# Patient Record
Sex: MALE | Race: WHITE | HISPANIC OR LATINO
[De-identification: names, ages, dates, MRNs, and addresses within clinical notes are randomized per-mention and may not be internally consistent; named-entity substitution may affect disease eponyms.]

---

## 2018-10-03 ENCOUNTER — APPOINTMENT (OUTPATIENT)
Dept: NEUROLOGY | Facility: CLINIC | Age: 38
End: 2018-10-03
Payer: COMMERCIAL

## 2018-10-03 VITALS
HEIGHT: 68 IN | DIASTOLIC BLOOD PRESSURE: 72 MMHG | BODY MASS INDEX: 24.86 KG/M2 | WEIGHT: 164 LBS | HEART RATE: 75 BPM | SYSTOLIC BLOOD PRESSURE: 113 MMHG

## 2018-10-03 DIAGNOSIS — F45.0 SOMATIZATION DISORDER: ICD-10-CM

## 2018-10-03 PROCEDURE — 99203 OFFICE O/P NEW LOW 30 MIN: CPT

## 2022-02-10 ENCOUNTER — APPOINTMENT (OUTPATIENT)
Dept: CARDIOLOGY | Facility: CLINIC | Age: 42
End: 2022-02-10
Payer: COMMERCIAL

## 2022-02-10 ENCOUNTER — APPOINTMENT (OUTPATIENT)
Dept: ELECTROPHYSIOLOGY | Facility: CLINIC | Age: 42
End: 2022-02-10
Payer: COMMERCIAL

## 2022-02-10 ENCOUNTER — NON-APPOINTMENT (OUTPATIENT)
Age: 42
End: 2022-02-10

## 2022-02-10 VITALS
OXYGEN SATURATION: 95 % | HEIGHT: 68 IN | WEIGHT: 142 LBS | BODY MASS INDEX: 21.52 KG/M2 | SYSTOLIC BLOOD PRESSURE: 109 MMHG | DIASTOLIC BLOOD PRESSURE: 73 MMHG | HEART RATE: 9 BPM

## 2022-02-10 DIAGNOSIS — R06.00 DYSPNEA, UNSPECIFIED: ICD-10-CM

## 2022-02-10 DIAGNOSIS — R42 DIZZINESS AND GIDDINESS: ICD-10-CM

## 2022-02-10 DIAGNOSIS — R00.2 PALPITATIONS: ICD-10-CM

## 2022-02-10 DIAGNOSIS — R55 SYNCOPE AND COLLAPSE: ICD-10-CM

## 2022-02-10 PROCEDURE — 93000 ELECTROCARDIOGRAM COMPLETE: CPT

## 2022-02-10 PROCEDURE — 99205 OFFICE O/P NEW HI 60 MIN: CPT

## 2022-02-10 NOTE — DISCUSSION/SUMMARY
[FreeTextEntry1] : Patient is a 41 year old man with h/o lightheadedness, FH of HLD  presented to establish care in setting of progressive worsening of left sided numbness with variable HR and BPs. 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 In the meantime, diagnosed with POTS - was tried on multiple meds - neurological, cardiac, muscle spasm meds. Heart rate is all over the place. Now developing elevtaed BP reading. Underwent multipleEMGs\par \par - will refer for an echo and pharm nuclear stress test 9Pt is not able to walk in setting of ciontinuous muscle spasms and gait instability)\par - will place a cardiopatch\par - BP stable. Encouraged the patient to monitor blood pressure at home, keep a log, and report results back to us for evaluation. Based on results, we will adjust the regimen as necessary.\par = ECG with no acute ischemic changes (stable from prior)\par - pt being followed ny neurologist\par

## 2022-02-10 NOTE — HISTORY OF PRESENT ILLNESS
[FreeTextEntry1] : Patient is a 41 year old man with h/o lightheadedness, FH of HLD  presented to establish care in setting of progressive worsening of left sided numbness with variable HR and BPs. 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 In the meantime, diagnosed with POTS - was tried on multiple meds - neurological, cardiac, muscle spasm meds. Heart rate is all over the place. Now developing elevtaed BP reading. Underwent multipleEMGs\par

## 2022-02-23 ENCOUNTER — APPOINTMENT (OUTPATIENT)
Dept: CARDIOLOGY | Facility: CLINIC | Age: 42
End: 2022-02-23
Payer: COMMERCIAL

## 2022-02-23 PROCEDURE — 93306 TTE W/DOPPLER COMPLETE: CPT

## 2022-02-28 PROCEDURE — 93244 EXT ECG>48HR<7D REV&INTERPJ: CPT

## 2022-04-08 ENCOUNTER — OUTPATIENT (OUTPATIENT)
Dept: OUTPATIENT SERVICES | Facility: HOSPITAL | Age: 42
LOS: 1 days | End: 2022-04-08
Payer: COMMERCIAL

## 2022-04-08 DIAGNOSIS — R00.2 PALPITATIONS: ICD-10-CM

## 2022-04-08 PROCEDURE — 93018 CV STRESS TEST I&R ONLY: CPT

## 2022-04-08 PROCEDURE — 93016 CV STRESS TEST SUPVJ ONLY: CPT

## 2022-04-08 PROCEDURE — 93017 CV STRESS TEST TRACING ONLY: CPT

## 2022-05-11 ENCOUNTER — APPOINTMENT (OUTPATIENT)
Dept: CV DIAGNOSITCS | Facility: HOSPITAL | Age: 42
End: 2022-05-11

## 2024-09-06 ENCOUNTER — INPATIENT (INPATIENT)
Facility: HOSPITAL | Age: 44
LOS: 3 days | Discharge: HOME | DRG: 179 | End: 2024-09-10
Attending: STUDENT IN AN ORGANIZED HEALTH CARE EDUCATION/TRAINING PROGRAM | Admitting: STUDENT IN AN ORGANIZED HEALTH CARE EDUCATION/TRAINING PROGRAM
Payer: COMMERCIAL

## 2024-09-06 VITALS
WEIGHT: 160.94 LBS | OXYGEN SATURATION: 94 % | RESPIRATION RATE: 18 BRPM | HEIGHT: 67 IN | SYSTOLIC BLOOD PRESSURE: 114 MMHG | HEART RATE: 139 BPM | TEMPERATURE: 101 F | DIASTOLIC BLOOD PRESSURE: 70 MMHG

## 2024-09-06 LAB
ALBUMIN SERPL ELPH-MCNC: 4 G/DL — SIGNIFICANT CHANGE UP (ref 3.5–5)
ALP SERPL-CCNC: 136 U/L — HIGH (ref 40–120)
ALT FLD-CCNC: 110 U/L DA — HIGH (ref 10–60)
ANION GAP SERPL CALC-SCNC: 8 MMOL/L — SIGNIFICANT CHANGE UP (ref 5–17)
APPEARANCE UR: CLEAR — SIGNIFICANT CHANGE UP
APTT BLD: 35.7 SEC — HIGH (ref 24.5–35.6)
AST SERPL-CCNC: 46 U/L — HIGH (ref 10–40)
BASOPHILS # BLD AUTO: 0.06 K/UL — SIGNIFICANT CHANGE UP (ref 0–0.2)
BASOPHILS NFR BLD AUTO: 0.6 % — SIGNIFICANT CHANGE UP (ref 0–2)
BILIRUB SERPL-MCNC: 0.3 MG/DL — SIGNIFICANT CHANGE UP (ref 0.2–1.2)
BILIRUB UR-MCNC: NEGATIVE — SIGNIFICANT CHANGE UP
BUN SERPL-MCNC: 12 MG/DL — SIGNIFICANT CHANGE UP (ref 7–18)
CALCIUM SERPL-MCNC: 9.4 MG/DL — SIGNIFICANT CHANGE UP (ref 8.4–10.5)
CHLORIDE SERPL-SCNC: 104 MMOL/L — SIGNIFICANT CHANGE UP (ref 96–108)
CO2 SERPL-SCNC: 26 MMOL/L — SIGNIFICANT CHANGE UP (ref 22–31)
COLOR SPEC: SIGNIFICANT CHANGE UP
CREAT SERPL-MCNC: 0.87 MG/DL — SIGNIFICANT CHANGE UP (ref 0.5–1.3)
DIFF PNL FLD: NEGATIVE — SIGNIFICANT CHANGE UP
EGFR: 110 ML/MIN/1.73M2 — SIGNIFICANT CHANGE UP
EOSINOPHIL # BLD AUTO: 0.02 K/UL — SIGNIFICANT CHANGE UP (ref 0–0.5)
EOSINOPHIL NFR BLD AUTO: 0.2 % — SIGNIFICANT CHANGE UP (ref 0–6)
GLUCOSE SERPL-MCNC: 100 MG/DL — HIGH (ref 70–99)
GLUCOSE UR QL: NEGATIVE MG/DL — SIGNIFICANT CHANGE UP
HCT VFR BLD CALC: 43.5 % — SIGNIFICANT CHANGE UP (ref 39–50)
HGB BLD-MCNC: 13.8 G/DL — SIGNIFICANT CHANGE UP (ref 13–17)
IMM GRANULOCYTES NFR BLD AUTO: 0.5 % — SIGNIFICANT CHANGE UP (ref 0–0.9)
INR BLD: 1.08 RATIO — SIGNIFICANT CHANGE UP (ref 0.85–1.18)
KETONES UR-MCNC: NEGATIVE MG/DL — SIGNIFICANT CHANGE UP
LACTATE SERPL-SCNC: 1.2 MMOL/L — SIGNIFICANT CHANGE UP (ref 0.7–2)
LEUKOCYTE ESTERASE UR-ACNC: NEGATIVE — SIGNIFICANT CHANGE UP
LYMPHOCYTES # BLD AUTO: 0.21 K/UL — LOW (ref 1–3.3)
LYMPHOCYTES # BLD AUTO: 2.2 % — LOW (ref 13–44)
MANUAL SMEAR VERIFICATION: SIGNIFICANT CHANGE UP
MCHC RBC-ENTMCNC: 26.2 PG — LOW (ref 27–34)
MCHC RBC-ENTMCNC: 31.7 GM/DL — LOW (ref 32–36)
MCV RBC AUTO: 82.5 FL — SIGNIFICANT CHANGE UP (ref 80–100)
MONOCYTES # BLD AUTO: 1.01 K/UL — HIGH (ref 0–0.9)
MONOCYTES NFR BLD AUTO: 10.8 % — SIGNIFICANT CHANGE UP (ref 2–14)
NEUTROPHILS # BLD AUTO: 8.02 K/UL — HIGH (ref 1.8–7.4)
NEUTROPHILS NFR BLD AUTO: 85.7 % — HIGH (ref 43–77)
NITRITE UR-MCNC: NEGATIVE — SIGNIFICANT CHANGE UP
NRBC # BLD: 0 /100 WBCS — SIGNIFICANT CHANGE UP (ref 0–0)
PH UR: 6.5 — SIGNIFICANT CHANGE UP (ref 5–8)
PLAT MORPH BLD: NORMAL — SIGNIFICANT CHANGE UP
PLATELET # BLD AUTO: 329 K/UL — SIGNIFICANT CHANGE UP (ref 150–400)
POTASSIUM SERPL-MCNC: 3.7 MMOL/L — SIGNIFICANT CHANGE UP (ref 3.5–5.3)
POTASSIUM SERPL-SCNC: 3.7 MMOL/L — SIGNIFICANT CHANGE UP (ref 3.5–5.3)
PROT SERPL-MCNC: 8 G/DL — SIGNIFICANT CHANGE UP (ref 6–8.3)
PROT UR-MCNC: NEGATIVE MG/DL — SIGNIFICANT CHANGE UP
PROTHROM AB SERPL-ACNC: 12.3 SEC — SIGNIFICANT CHANGE UP (ref 9.5–13)
RBC # BLD: 5.27 M/UL — SIGNIFICANT CHANGE UP (ref 4.2–5.8)
RBC # FLD: 12.9 % — SIGNIFICANT CHANGE UP (ref 10.3–14.5)
RBC BLD AUTO: NORMAL — SIGNIFICANT CHANGE UP
SODIUM SERPL-SCNC: 138 MMOL/L — SIGNIFICANT CHANGE UP (ref 135–145)
SP GR SPEC: 1.01 — SIGNIFICANT CHANGE UP (ref 1–1.03)
TROPONIN I, HIGH SENSITIVITY RESULT: 3.8 NG/L — SIGNIFICANT CHANGE UP
UROBILINOGEN FLD QL: 0.2 MG/DL — SIGNIFICANT CHANGE UP (ref 0.2–1)
WBC # BLD: 9.37 K/UL — SIGNIFICANT CHANGE UP (ref 3.8–10.5)
WBC # FLD AUTO: 9.37 K/UL — SIGNIFICANT CHANGE UP (ref 3.8–10.5)

## 2024-09-06 PROCEDURE — 99285 EMERGENCY DEPT VISIT HI MDM: CPT

## 2024-09-06 PROCEDURE — 71046 X-RAY EXAM CHEST 2 VIEWS: CPT | Mod: 26

## 2024-09-06 PROCEDURE — 93010 ELECTROCARDIOGRAM REPORT: CPT

## 2024-09-06 RX ORDER — SODIUM CHLORIDE 9 MG/ML
2300 INJECTION INTRAMUSCULAR; INTRAVENOUS; SUBCUTANEOUS ONCE
Refills: 0 | Status: COMPLETED | OUTPATIENT
Start: 2024-09-06 | End: 2024-09-06

## 2024-09-06 RX ADMIN — SODIUM CHLORIDE 2300 MILLILITER(S): 9 INJECTION INTRAMUSCULAR; INTRAVENOUS; SUBCUTANEOUS at 22:25

## 2024-09-06 NOTE — ED ADULT TRIAGE NOTE - CHIEF COMPLAINT QUOTE
Pt reports that he has   CHEST  pain , FEVER , NECK pain, PALPITATION x 1 day .  positive covid   home test . CODE SEPSIS activated

## 2024-09-06 NOTE — ED ADULT NURSE NOTE - OBJECTIVE STATEMENT
AAOX4 c/o chest pain, body aches, reports positive home COVID test & SOB, fever , elevated HR starting earlier today. dENIES n/v/d

## 2024-09-07 ENCOUNTER — TRANSCRIPTION ENCOUNTER (OUTPATIENT)
Age: 44
End: 2024-09-07

## 2024-09-07 DIAGNOSIS — U07.1 COVID-19: ICD-10-CM

## 2024-09-07 DIAGNOSIS — Z29.9 ENCOUNTER FOR PROPHYLACTIC MEASURES, UNSPECIFIED: ICD-10-CM

## 2024-09-07 DIAGNOSIS — G90.9 DISORDER OF THE AUTONOMIC NERVOUS SYSTEM, UNSPECIFIED: ICD-10-CM

## 2024-09-07 DIAGNOSIS — R65.10 SYSTEMIC INFLAMMATORY RESPONSE SYNDROME (SIRS) OF NON-INFECTIOUS ORIGIN WITHOUT ACUTE ORGAN DYSFUNCTION: ICD-10-CM

## 2024-09-07 DIAGNOSIS — A41.9 SEPSIS, UNSPECIFIED ORGANISM: ICD-10-CM

## 2024-09-07 DIAGNOSIS — G45.9 TRANSIENT CEREBRAL ISCHEMIC ATTACK, UNSPECIFIED: ICD-10-CM

## 2024-09-07 LAB
ALBUMIN SERPL ELPH-MCNC: 3.3 G/DL — LOW (ref 3.5–5)
ALP SERPL-CCNC: 106 U/L — SIGNIFICANT CHANGE UP (ref 40–120)
ALT FLD-CCNC: 90 U/L DA — HIGH (ref 10–60)
ANION GAP SERPL CALC-SCNC: 6 MMOL/L — SIGNIFICANT CHANGE UP (ref 5–17)
AST SERPL-CCNC: 39 U/L — SIGNIFICANT CHANGE UP (ref 10–40)
B PERT DNA SPEC QL NAA+PROBE: SIGNIFICANT CHANGE UP
BILIRUB SERPL-MCNC: 0.3 MG/DL — SIGNIFICANT CHANGE UP (ref 0.2–1.2)
BUN SERPL-MCNC: 9 MG/DL — SIGNIFICANT CHANGE UP (ref 7–18)
C PNEUM DNA SPEC QL NAA+PROBE: SIGNIFICANT CHANGE UP
CALCIUM SERPL-MCNC: 9 MG/DL — SIGNIFICANT CHANGE UP (ref 8.4–10.5)
CHLORIDE SERPL-SCNC: 110 MMOL/L — HIGH (ref 96–108)
CO2 SERPL-SCNC: 26 MMOL/L — SIGNIFICANT CHANGE UP (ref 22–31)
CREAT SERPL-MCNC: 0.88 MG/DL — SIGNIFICANT CHANGE UP (ref 0.5–1.3)
EGFR: 109 ML/MIN/1.73M2 — SIGNIFICANT CHANGE UP
FLUAV H1 2009 PAND RNA SPEC QL NAA+PROBE: SIGNIFICANT CHANGE UP
FLUAV H1 RNA SPEC QL NAA+PROBE: SIGNIFICANT CHANGE UP
FLUAV H3 RNA SPEC QL NAA+PROBE: SIGNIFICANT CHANGE UP
FLUAV SUBTYP SPEC NAA+PROBE: SIGNIFICANT CHANGE UP
FLUBV RNA SPEC QL NAA+PROBE: SIGNIFICANT CHANGE UP
GLUCOSE SERPL-MCNC: 108 MG/DL — HIGH (ref 70–99)
HADV DNA SPEC QL NAA+PROBE: SIGNIFICANT CHANGE UP
HCOV PNL SPEC NAA+PROBE: SIGNIFICANT CHANGE UP
HCT VFR BLD CALC: 38.9 % — LOW (ref 39–50)
HGB BLD-MCNC: 12.5 G/DL — LOW (ref 13–17)
HMPV RNA SPEC QL NAA+PROBE: SIGNIFICANT CHANGE UP
HPIV1 RNA SPEC QL NAA+PROBE: SIGNIFICANT CHANGE UP
HPIV2 RNA SPEC QL NAA+PROBE: SIGNIFICANT CHANGE UP
HPIV3 RNA SPEC QL NAA+PROBE: SIGNIFICANT CHANGE UP
HPIV4 RNA SPEC QL NAA+PROBE: SIGNIFICANT CHANGE UP
MAGNESIUM SERPL-MCNC: 2.2 MG/DL — SIGNIFICANT CHANGE UP (ref 1.6–2.6)
MCHC RBC-ENTMCNC: 26.7 PG — LOW (ref 27–34)
MCHC RBC-ENTMCNC: 32.1 GM/DL — SIGNIFICANT CHANGE UP (ref 32–36)
MCV RBC AUTO: 83.1 FL — SIGNIFICANT CHANGE UP (ref 80–100)
NRBC # BLD: 0 /100 WBCS — SIGNIFICANT CHANGE UP (ref 0–0)
PHOSPHATE SERPL-MCNC: 3.3 MG/DL — SIGNIFICANT CHANGE UP (ref 2.5–4.5)
PLATELET # BLD AUTO: 289 K/UL — SIGNIFICANT CHANGE UP (ref 150–400)
POTASSIUM SERPL-MCNC: 3.8 MMOL/L — SIGNIFICANT CHANGE UP (ref 3.5–5.3)
POTASSIUM SERPL-SCNC: 3.8 MMOL/L — SIGNIFICANT CHANGE UP (ref 3.5–5.3)
PROT SERPL-MCNC: 6.6 G/DL — SIGNIFICANT CHANGE UP (ref 6–8.3)
RAPID RVP RESULT: DETECTED
RBC # BLD: 4.68 M/UL — SIGNIFICANT CHANGE UP (ref 4.2–5.8)
RBC # FLD: 13.2 % — SIGNIFICANT CHANGE UP (ref 10.3–14.5)
RV+EV RNA SPEC QL NAA+PROBE: SIGNIFICANT CHANGE UP
SARS-COV-2 RNA SPEC QL NAA+PROBE: DETECTED
SODIUM SERPL-SCNC: 142 MMOL/L — SIGNIFICANT CHANGE UP (ref 135–145)
TSH SERPL-MCNC: 0.6 UU/ML — SIGNIFICANT CHANGE UP (ref 0.34–4.82)
WBC # BLD: 7.09 K/UL — SIGNIFICANT CHANGE UP (ref 3.8–10.5)
WBC # FLD AUTO: 7.09 K/UL — SIGNIFICANT CHANGE UP (ref 3.8–10.5)

## 2024-09-07 PROCEDURE — 70450 CT HEAD/BRAIN W/O DYE: CPT | Mod: 26

## 2024-09-07 PROCEDURE — 99223 1ST HOSP IP/OBS HIGH 75: CPT

## 2024-09-07 PROCEDURE — 74176 CT ABD & PELVIS W/O CONTRAST: CPT | Mod: 26

## 2024-09-07 RX ORDER — ACETAMINOPHEN 325 MG/1
650 TABLET ORAL EVERY 6 HOURS
Refills: 0 | Status: DISCONTINUED | OUTPATIENT
Start: 2024-09-07 | End: 2024-09-10

## 2024-09-07 RX ORDER — SODIUM CHLORIDE 9 MG/ML
500 INJECTION INTRAMUSCULAR; INTRAVENOUS; SUBCUTANEOUS ONCE
Refills: 0 | Status: COMPLETED | OUTPATIENT
Start: 2024-09-07 | End: 2024-09-07

## 2024-09-07 RX ORDER — PIPERACILLIN SODIUM AND TAZOBACTAM SODIUM 3; .375 G/15ML; G/15ML
3.38 INJECTION, POWDER, FOR SOLUTION INTRAVENOUS ONCE
Refills: 0 | Status: COMPLETED | OUTPATIENT
Start: 2024-09-07 | End: 2024-09-07

## 2024-09-07 RX ORDER — IBUPROFEN 600 MG
200 TABLET ORAL ONCE
Refills: 0 | Status: COMPLETED | OUTPATIENT
Start: 2024-09-07 | End: 2024-09-07

## 2024-09-07 RX ORDER — ONDANSETRON 2 MG/ML
4 INJECTION, SOLUTION INTRAMUSCULAR; INTRAVENOUS EVERY 8 HOURS
Refills: 0 | Status: DISCONTINUED | OUTPATIENT
Start: 2024-09-07 | End: 2024-09-10

## 2024-09-07 RX ORDER — SODIUM CHLORIDE 9 MG/ML
1000 INJECTION INTRAMUSCULAR; INTRAVENOUS; SUBCUTANEOUS
Refills: 0 | Status: DISCONTINUED | OUTPATIENT
Start: 2024-09-07 | End: 2024-09-09

## 2024-09-07 RX ORDER — GUAIFENESIN 100 MG/5ML
600 LIQUID ORAL EVERY 12 HOURS
Refills: 0 | Status: DISCONTINUED | OUTPATIENT
Start: 2024-09-07 | End: 2024-09-10

## 2024-09-07 RX ORDER — BENZOCAINE/MENTHOL 20 %-0.5 %
1 AEROSOL (GRAM) TOPICAL THREE TIMES A DAY
Refills: 0 | Status: DISCONTINUED | OUTPATIENT
Start: 2024-09-07 | End: 2024-09-10

## 2024-09-07 RX ORDER — ENOXAPARIN SODIUM 100 MG/ML
40 INJECTION SUBCUTANEOUS EVERY 24 HOURS
Refills: 0 | Status: DISCONTINUED | OUTPATIENT
Start: 2024-09-07 | End: 2024-09-10

## 2024-09-07 RX ORDER — ACETAMINOPHEN 325 MG/1
100 TABLET ORAL ONCE
Refills: 0 | Status: COMPLETED | OUTPATIENT
Start: 2024-09-07 | End: 2024-09-07

## 2024-09-07 RX ORDER — MAGNESIUM, ALUMINUM HYDROXIDE 200-225/5
30 SUSPENSION, ORAL (FINAL DOSE FORM) ORAL EVERY 4 HOURS
Refills: 0 | Status: DISCONTINUED | OUTPATIENT
Start: 2024-09-07 | End: 2024-09-10

## 2024-09-07 RX ORDER — SODIUM CHLORIDE 0.65 %
1 AEROSOL, SPRAY (ML) NASAL
Refills: 0 | Status: DISCONTINUED | OUTPATIENT
Start: 2024-09-07 | End: 2024-09-10

## 2024-09-07 RX ADMIN — Medication 200 MILLIGRAM(S): at 11:45

## 2024-09-07 RX ADMIN — ACETAMINOPHEN 650 MILLIGRAM(S): 325 TABLET ORAL at 14:10

## 2024-09-07 RX ADMIN — Medication 1 SPRAY(S): at 12:13

## 2024-09-07 RX ADMIN — ACETAMINOPHEN 100 MILLIGRAM(S): 325 TABLET ORAL at 05:40

## 2024-09-07 RX ADMIN — SODIUM CHLORIDE 100 MILLILITER(S): 9 INJECTION INTRAMUSCULAR; INTRAVENOUS; SUBCUTANEOUS at 07:38

## 2024-09-07 RX ADMIN — ACETAMINOPHEN 650 MILLIGRAM(S): 325 TABLET ORAL at 22:30

## 2024-09-07 RX ADMIN — GUAIFENESIN 600 MILLIGRAM(S): 100 LIQUID ORAL at 17:14

## 2024-09-07 RX ADMIN — Medication 200 MILLIGRAM(S): at 12:38

## 2024-09-07 RX ADMIN — SODIUM CHLORIDE 100 MILLILITER(S): 9 INJECTION INTRAMUSCULAR; INTRAVENOUS; SUBCUTANEOUS at 10:03

## 2024-09-07 RX ADMIN — ACETAMINOPHEN 100 MILLIGRAM(S): 325 TABLET ORAL at 03:04

## 2024-09-07 RX ADMIN — ACETAMINOPHEN 650 MILLIGRAM(S): 325 TABLET ORAL at 15:10

## 2024-09-07 RX ADMIN — ACETAMINOPHEN 650 MILLIGRAM(S): 325 TABLET ORAL at 21:31

## 2024-09-07 RX ADMIN — Medication 1 LOZENGE: at 23:31

## 2024-09-07 RX ADMIN — ENOXAPARIN SODIUM 40 MILLIGRAM(S): 100 INJECTION SUBCUTANEOUS at 11:45

## 2024-09-07 RX ADMIN — PIPERACILLIN SODIUM AND TAZOBACTAM SODIUM 200 GRAM(S): 3; .375 INJECTION, POWDER, FOR SOLUTION INTRAVENOUS at 06:38

## 2024-09-07 RX ADMIN — SODIUM CHLORIDE 500 MILLILITER(S): 9 INJECTION INTRAMUSCULAR; INTRAVENOUS; SUBCUTANEOUS at 10:04

## 2024-09-07 RX ADMIN — ACETAMINOPHEN 650 MILLIGRAM(S): 325 TABLET ORAL at 08:55

## 2024-09-07 RX ADMIN — ACETAMINOPHEN 650 MILLIGRAM(S): 325 TABLET ORAL at 07:42

## 2024-09-07 NOTE — H&P ADULT - ASSESSMENT
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 Patient admitted for CVA r/o and sepsis. 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 Patient admitted for Sepsis and CVA r/o

## 2024-09-07 NOTE — CONSULT NOTE ADULT - TIME BILLING
- Review of records, telemetry, vital signs and daily labs.   - General and cardiovascular physical examination.  - Generation of cardiovascular treatment plan and completion of note .  - Coordination of care.      Patient was seen and examined by me on  9/7/24interim events noted,labs and radiology studies reviewed.  Ayaz Trotter MD,FACC.  [address] [facility].  [phone]

## 2024-09-07 NOTE — H&P ADULT - NSHPPOAPULMEMBOLUS_GEN_A_CORE
[EKG obtained to assist in diagnosis and management of assessed problem(s)] : EKG obtained to assist in diagnosis and management of assessed problem(s) no

## 2024-09-07 NOTE — H&P ADULT - NSHPPHYSICALEXAM_GEN_ALL_CORE
PHYSICAL EXAMINATION:  GENERAL: NAD  HEAD:  Atraumatic, Normocephalic  EYES:  conjunctiva and sclera clear  NECK: Supple, No JVD, Normal thyroid  CHEST/LUNG: Clear to auscultation. No rales, rhonchi, wheezing, or rubs  HEART: Regular rate and rhythm; No murmurs, rubs, or gallops  ABDOMEN: Soft, Nontender, Nondistended; Bowel sounds present  NERVOUS SYSTEM:  Alert & Oriented X3, patient has intermittent twitches, subjective left leg weakness, upper extremity power appears in tact, no CN deficits  EXTREMITIES:  2+ Peripheral Pulses, No clubbing, cyanosis, or edema  SKIN: warm dry    T(C): 38.1 (09-07-24 @ 07:51), Max: 38.4 (09-06-24 @ 21:18)  HR: 115 (09-07-24 @ 07:51) (115 - 139)  BP: 102/65 (09-07-24 @ 07:51) (102/65 - 120/79)  RR: 15 (09-07-24 @ 07:51) (14 - 18)  SpO2: 100% (09-07-24 @ 07:51) (94% - 100%)

## 2024-09-07 NOTE — ED PROVIDER NOTE - NS ED ROS FT
Constitutional: no fevers, chills  HEENT: +HA, no vision changes, rhinorrhea, sore throat  Cardiac: +chest pain, palpitations  Respiratory: no SOB, cough or hemoptysis  GI: no n/v/d/c, abd pain, bloody or dark stools  : no dysuria, +frequency, no hematuria  MSK: no joint pain, neck pain +back pain  Skin: no rashes, jaundice, pruritis  Neuro: no numbness/tingling. bedbound.   ROS otherwise neg except per MDM

## 2024-09-07 NOTE — ED PROVIDER NOTE - PHYSICAL EXAMINATION
General: non-toxic, NAD  HEENT: NCAT, PERRL, no conjunctival pallor, moist mucus membranes   Cardiac: tachycardic no murmurs, 2+ peripheral pulses  Resp: CTAB  Abdomen: soft, non-distended, bowel sounds present, no ttp, no rebound or guarding. no organomegaly  Extremities: no peripheral edema, calf tenderness, or leg size discrepancies  Skin: no rashes  Neuro: AAOx4, moving all extremities. poor lower extremity strength against gravity. diminished L UE sensation in distribution of prior TIA, otherwise grossly intact sensation. CN 2-12 intact.   Psych: mood and affect appropriate

## 2024-09-07 NOTE — H&P ADULT - PROBLEM SELECTOR PLAN 1
likely 2/2 COVID  f/u CTAP for potential alternative source of infection  s/p zosyn in ED  hold ABx unless source identified  supportive measures  c/w IVF

## 2024-09-07 NOTE — DISCHARGE NOTE PROVIDER - CARE PROVIDER_API CALL
Jaun Bowman [facility]  Internal Medicine  [address], [address]  [place] [address]  Phone: [phone]  Fax: [phone]  Follow Up Time:    Amber [facility]  Internal Medicine  [address] [place] [address]  Phone: [phone]  Fax: [phone]  Follow Up Time:    Ritchie Clark  Internal Medicine  [address] [place] [address]  Phone: [phone]  Fax: [phone]  Follow Up Time:     Bess Gavin  Urology  [address] [place] [address]  Phone: [phone]  Fax: [phone]  Follow Up Time: 2 weeks    Elliott Eisenberg  Neurology  [address], [address]  [place] [address]  Phone: [phone]  Fax: [phone]  Follow Up Time: 2 weeks

## 2024-09-07 NOTE — CONSULT NOTE ADULT - SUBJECTIVE AND OBJECTIVE BOX
PATIENT SEEN AND EXAMINED BY JOSE GUILLEN M.D. ON :- 9/7/24  DATE OF SERVICE:   9/7/24          Interim events noted,Labs ,Radiological studies and Cardiology tests reviewed .    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 Patient stated he started feeling chills with weakness and muscle aches yesterday morning. He was having palpitations and tachycardia as well as chest pain radiating to his left shoulder, similar to when he had his TIA in 2015. Patient took his PRN propanolol which usually resolves the palpitations and tachycardia but it did not work so he called his PCP who advised him to go to the ED for an EKG. Patient denies any dizziness ,headache, SOB, cough,  nausea, vomiting, diarrhea, abdominal pain,  lower extremity pain, or edema. Patient denies recent travel or sick contacts.  (07 Sep 2024 06:27)      PAST MEDICAL & SURGICAL HISTORY:      PREVIOUS DIAGNOSTIC TESTING:      ECHO  FINDINGS:    STRESS  FINDINGS:    CATHETERIZATION  FINDINGS:    MEDICATIONS  (STANDING):  benzocaine/menthol Lozenge 1 Lozenge Oral three times a day  enoxaparin Injectable 40 milliGRAM(s) SubCutaneous every 24 hours  guaiFENesin  milliGRAM(s) Oral every 12 hours  propranolol 5 milliGRAM(s) Oral every 12 hours  sodium chloride 0.9%. 1000 milliLiter(s) (100 mL/Hr) IV Continuous <Continuous>    MEDICATIONS  (PRN):  acetaminophen     Tablet .. 650 milliGRAM(s) Oral every 6 hours PRN Temp greater or equal to 38C (100.4F), Mild Pain (1 - 3)  aluminum hydroxide/magnesium hydroxide/simethicone Suspension 30 milliLiter(s) Oral every 4 hours PRN Dyspepsia  melatonin 3 milliGRAM(s) Oral at bedtime PRN Insomnia  ondansetron Injectable 4 milliGRAM(s) IV Push every 8 hours PRN Nausea and/or Vomiting  sodium chloride 0.65% Nasal 1 Spray(s) Both Nostrils four times a day PRN Nasal Congestion      FAMILY HISTORY:      SOCIAL HISTORY:    CIGARETTES:    ALCOHOL:    REVIEW OF SYSTEMS:  CONSTITUTIONAL: No fever, weight loss, or fatigue  EYES: No eye pain, visual disturbances, or discharge  ENMT:  No difficulty hearing, tinnitus, vertigo; No sinus or throat pain  NECK: No pain or stiffness  RESPIRATORY: No cough, wheezing, chills or hemoptysis; No shortness of breath  CARDIOVASCULAR: No chest pain, palpitations, dizziness, or leg swelling  GASTROINTESTINAL: No abdominal or epigastric pain. No nausea, vomiting, or hematemesis; No diarrhea or constipation. No melena or hematochezia.  GENITOURINARY: No dysuria, frequency, hematuria, or incontinence  NEUROLOGICAL: No headaches, memory loss, loss of strength, numbness, or tremors  SKIN: No itching, burning, rashes, or lesions   LYMPH NODES: No enlarged glands  ENDOCRINE: No heat or cold intolerance; No hair loss  MUSCULOSKELETAL: No joint pain or swelling; No muscle, back, or extremity pain  PSYCHIATRIC: No depression, anxiety, mood swings, or difficulty sleeping  HEME/LYMPH: No easy bruising, or bleeding gums  ALLERY AND IMMUNOLOGIC: No hives or eczema    Vital Signs Last 24 Hrs  T(C): 37.9 (07 Sep 2024 19:56), Max: 38.3 (07 Sep 2024 11:57)  T(F): 100.2 (07 Sep 2024 19:56), Max: 100.9 (07 Sep 2024 11:57)  HR: 104 (07 Sep 2024 19:56) (95 - 116)  BP: 119/80 (07 Sep 2024 19:56) (98/62 - 120/79)  BP(mean): --  RR: 18 (07 Sep 2024 19:56) (14 - 19)  SpO2: 99% (07 Sep 2024 19:56) (95% - 100%)    Parameters below as of 07 Sep 2024 19:56  Patient On (Oxygen Delivery Method): room air          PHYSICAL EXAM:  GENERAL: NAD, well-groomed, well-developed  HEAD:  Atraumatic, Normocephalic  EYES: EOMI, PERRLA, conjunctiva and sclera clear  ENMT: No tonsillar erythema, exudates, or enlargement; Moist mucous membranes, Good dentition, No lesions  NECK: Supple, No JVD, Normal thyroid  NERVOUS SYSTEM:  Alert & Oriented X3, Good concentration; Motor Strength 5/5 B/L upper and lower extremities; DTRs 2+ intact and symmetric  CHEST/LUNG: Clear to percussion bilaterally; No rales, rhonchi, wheezing, or rubs  HEART: Regular rate and rhythm; No murmurs, rubs, or gallops  ABDOMEN: Soft, Nontender, Nondistended; Bowel sounds present  EXTREMITIES:  2+ Peripheral Pulses, No clubbing, cyanosis, or edema  LYMPH: No lymphadenopathy noted  SKIN: No rashes or lesions      INTERPRETATION OF TELEMETRY:    [facility]    [facility]     LABS:                        12.5   7.09  )-----------( 289      ( 07 Sep 2024 12:13 )             38.9     09-07    142  |  110<H>  |  9   ----------------------------<  108<H>  3.8   |  26  |  0.88    Ca    9.0      07 Sep 2024 12:13  Phos  3.3     09-07  Mg     2.2     09-07    TPro  6.6  /  Alb  3.3<L>  /  TBili  0.3  /  DBili  x   /  AST  39  /  ALT  90<H>  /  AlkPhos  106  09-07        PT/INR - ( 06 Sep 2024 22:00 )   PT: 12.3 sec;   INR: 1.08 ratio         PTT - ( 06 Sep 2024 22:00 )  PTT:35.7 sec  Urinalysis Basic - ( 07 Sep 2024 12:13 )    Color: x / Appearance: x / SG: x / pH: x  Gluc: 108 mg/dL / Ketone: x  / Bili: x / Urobili: x   Blood: x / Protein: x / Nitrite: x   Leuk Esterase: x / RBC: x / WBC x   Sq Epi: x / Non Sq Epi: x / Bacteria: x      Lipid Panel:   I&O's Summary      RADIOLOGY & ADDITIONAL STUDIES:

## 2024-09-07 NOTE — ED PROVIDER NOTE - CLINICAL SUMMARY MEDICAL DECISION MAKING FREE TEXT BOX
pt with hx TIA secondary to traumatic chiropractic manipulation in 2015, ischemic brain injury, autonomic dysfunction, bedbound at baseline presents with daughter after testing positive for covid at home today in the setting of chest pain and persistently elevated HR, sent in for evaluation by his PCP. pt endorses pain in the neck, arm, and low back. unable to take strong doses of pain medication due to autonomic instability, requesting 100mg tylenol only. no falls or trauma. sensation mildly decreased in distribution of prior CVA. possible recrudescence of prior injury in the setting of acute illness. strength to upper extremities intact. clear lungs and stable O2 on room air. pt requesting to be admitted as his parents are elderly and unable to take care of him with this contagious illness. r/o superimposed PE v pna. otherwise hold empiric antibiotics at this time.

## 2024-09-07 NOTE — CONSULT NOTE ADULT - ASSESSMENT
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 Patient admitted for Sepsis and CVA r/o      # Sepsis.   # covid  # TIA   TIA 2/2 traumatic chiropractic manipulation  patient possibly having recurrance of symptoms ISO infection  f/u CTH.

## 2024-09-07 NOTE — DISCHARGE NOTE PROVIDER - PROVIDER TOKENS
PROVIDER:[TOKEN:[[id number]]] PROVIDER:[TOKEN:[[id number]]] PROVIDER:[TOKEN:[[id number]]],PROVIDER:[TOKEN:[[id number]],FOLLOWUP:[2 weeks]],PROVIDER:[TOKEN:[[id number]],FOLLOWUP:[2 weeks]]

## 2024-09-07 NOTE — H&P ADULT - ATTENDING COMMENTS
44yo m, former smoker, w pots, gerd, somatization disorder vs chiropractic stroke (at baseline w unsteady gait, vertigo, myoclonic jerks, left sided paresthesias), p/w positive covid test; in er, found to be meeting sepsis criteria; admit to medicine for further mgmt.    Sepsis  Left shift, febrile, tachycardic; meets sepsis criteria  source; based on work up thus far, suspecting covid  s/p 2.3 L ns + zosyn in ER  follow up blood cultures; full rvp  Monitor for fever, changes in white count; hold off on further abx for now pending work up  ivf resusci + lytes as needed.    Covid  in no respiratory distress on encounter, w adequate spo2 at room air  chest imaging w no acute significant pathological findings  follow up full rvp  trend inflammatory markers    isolation precautions  Monitor SpO2, RR, for signs of respiratory distress; Goal SpO2 >88-92%, PaO2 >55-60 mmHg  bronchodilators + oxygen supplementation as needed to  aggressive pulmonary toilet/hygiene   symptomatic relief with antitussives, mucolytics, antipyretics as needed  does not meet criteria for remdesivir or dexamethasone use    Otherwise, concur w a+p as described by resident above

## 2024-09-07 NOTE — H&P ADULT - PROBLEM SELECTOR PLAN 4
2/2 traumatic chiropractic manipulation  on PRN metoprolol 2.5 at home 2/2 traumatic chiropractic manipulation  on PRN propanolol 2.5 at home  consider cardio consult if persisitent tachy

## 2024-09-07 NOTE — DISCHARGE NOTE PROVIDER - NSDCCAREPROVSEEN_GEN_ALL_CORE_FT
Ni Barrett Arnold, Ni Barrett, Ni Mcconnell, Paulie Mcgregor, Abram Trotter, Ayaz WATTS Arnold, Ni Barrett, Ni Mcconnell, Paulie Mcgregor, Abram Trotter, Whitney Brunson

## 2024-09-07 NOTE — DISCHARGE NOTE PROVIDER - ATTENDING DISCHARGE PHYSICAL EXAMINATION:
GENERAL: NAD, well built  HEAD:  Atraumatic, Normocephalic  EYES:  conjunctiva and sclera clear  CHEST/LUNG: Clear to auscultation. No rales, rhonchi, wheezing, or rubs  HEART: Regular rate and rhythm; No murmurs, rubs, or gallops  ABDOMEN: Soft, Nontender, Nondistended; Bowel sounds present  NERVOUS SYSTEM:  Alert & Oriented X3,  No focal deficits  EXTREMITIES:  2+ Peripheral Pulses, No clubbing, cyanosis, or edema  SKIN: warm dry

## 2024-09-07 NOTE — DISCHARGE NOTE PROVIDER - NSDCCPCAREPLAN_GEN_ALL_CORE_FT
PRINCIPAL DISCHARGE DIAGNOSIS  Diagnosis: COVID-19  Assessment and Plan of Treatment: .For up to date information information on COVID -19, you may contact the Department of Health Hotline at [phone]. Tylenol 650mg tablet twice a day for fever and you can also apply cool compress for fever. You can take Aleve 2 pills twice a day with food for bodyaches .  Perform frequent good hand hygiene by washing your hands with soap and hot water as can tolerate but not to burn your hands.  Disinfect your house doorknobs, steering wheels, refrigerator door handles.  If your symptoms worsen, fever, chills, chest pain or sob, call your doctor.  Call 911 for severe shortness of breath or get to your nearest Emergency Department and inform the staff or 911 that you were tested positive for COVID-19.  Continue to self quarantine and wear a mask so as to not infect others.  Drink lots of fluids.        SECONDARY DISCHARGE DIAGNOSES  Diagnosis: TIA (transient ischemic attack)  Assessment and Plan of Treatment:     Diagnosis: Autonomic dysfunction  Assessment and Plan of Treatment:      PRINCIPAL DISCHARGE DIAGNOSIS  Diagnosis: COVID-19  Assessment and Plan of Treatment: You presented to the ED with a positive COVID test. A repeat PCR test confirmed a COVID infection. You were found to have a fever and received Tylenol. You received supportive care (medication mitigating your symptoms) with which your symptoms improved.   Please follow up with your PCP within one week of discharge for follow up and further management.        SECONDARY DISCHARGE DIAGNOSES  Diagnosis: Autonomic dysfunction  Assessment and Plan of Treatment: You have a history of autonomic dysfunction which causes an increased heart rate. You take Propanolol as needed at home. During this admission, you were seen by a Cardiologist (heart specialist) who recommended 5mg of Propanolol twice daily. During this admission, your heart rate was well controlled around 90bpm under this regimen.  Please follow up with your PCP within one week of discharge for follow up and further management.    Diagnosis: TIA (transient ischemic attack)  Assessment and Plan of Treatment: You have a history of TIA (transient ischemic attack), which is a brief loss of neurological funciton. You described your symptoms to be similar to the previous episode, so a CT head was performed. It showed no signs of intracranial hemorrhage. Your sympo     PRINCIPAL DISCHARGE DIAGNOSIS  Diagnosis: COVID-19  Assessment and Plan of Treatment: You presented to the ED with a positive COVID test. A repeat PCR test confirmed a COVID infection. You were found to have a fever and received Tylenol. You received supportive care (medication mitigating your symptoms) with which your symptoms improved.   Please follow up with your PCP within one week of discharge for follow up and further management.        SECONDARY DISCHARGE DIAGNOSES  Diagnosis: Autonomic dysfunction  Assessment and Plan of Treatment: You have a history of autonomic dysfunction which causes an increased heart rate. You take Propanolol as needed at home. During this admission, you were seen by a Cardiologist (heart specialist) who recommended 5mg of Propanolol twice daily. During this admission, your heart rate was well controlled around 90bpm under this regimen.  Please follow up with your PCP within one week of discharge for follow up and further management.    Diagnosis: TIA (transient ischemic attack)  Assessment and Plan of Treatment: You have a history of TIA (transient ischemic attack), which is a brief loss of neurological funciton. You described your symptoms to be similar to the previous episode, so a CT head was performed. It showed no signs of intracranial hemorrhage. Your sympo    Diagnosis: Hepatic steatosis  Assessment and Plan of Treatment: You were found to have elevated liver function markers in your admission blood work.     PRINCIPAL DISCHARGE DIAGNOSIS  Diagnosis: COVID-19  Assessment and Plan of Treatment: You presented to the ED with a positive COVID test. A repeat PCR test confirmed a COVID infection. You were found to have a fever and received Tylenol. You received supportive care (medication mitigating your symptoms) with which your symptoms improved.   Please follow up with your PCP within one week of discharge for follow up and further management.        SECONDARY DISCHARGE DIAGNOSES  Diagnosis: Autonomic dysfunction  Assessment and Plan of Treatment: You have a history of autonomic dysfunction which causes an increased heart rate. You take Propanolol as needed at home. During this admission, you were seen by a Cardiologist (heart specialist) who recommended 5mg of Propanolol twice daily. During this admission, your heart rate was well controlled around 90bpm under this regimen.  Please follow up with your PCP within one week of discharge for follow up and further management.    Diagnosis: TIA (transient ischemic attack)  Assessment and Plan of Treatment: You have a history of TIA (transient ischemic attack), which is a brief loss of neurological funciton. You described your symptoms to be similar to the previous episode, so a CT head was performed. It showed no signs of intracranial hemorrhage. Your symptoms improved.   Please follow up with your PCP within one week of discharge for follow up and further management.    Diagnosis: Hepatic steatosis  Assessment and Plan of Treatment: You were found to have elevated liver function markers in your admission blood work. An ultrasound of the right upper quadrant showed hepatic steatosis (fat buildup in the liver) and mild hepatomegaly (enlargement of the liver).  Please follow up with your PCP within one week of discharge for follow up blood work and further management. If your liver function tests remain elevated, we recommend follow up with a Hepatologist (liver specialist).       Diagnosis: Hematuria  Assessment and Plan of Treatment: During your admission, you reported that you had noticed blood in your urine. A urine analysis confirmed red blood cells in your urine, but no signs of infection.   Please follow up with your PCP within one week of discharge for follow up and further management. We also strongly recommend following up with a Urologist to complete the previously recommended cystoscopy.     PRINCIPAL DISCHARGE DIAGNOSIS  Diagnosis: COVID-19  Assessment and Plan of Treatment: You presented to the ED with a positive COVID test. A repeat PCR test confirmed a COVID infection. You were found to have a fever and received Tylenol. You received supportive care (medication mitigating your symptoms) with which your symptoms improved.   Please follow up with your PCP within one week of discharge for follow up and further management.        SECONDARY DISCHARGE DIAGNOSES  Diagnosis: Autonomic dysfunction  Assessment and Plan of Treatment: You have a history of autonomic dysfunction which causes an increased heart rate. You take Propanolol as needed at home. During this admission, you were seen by a Cardiologist (heart specialist) who recommended 5mg of Propanolol twice daily. During this admission, your heart rate was well controlled around 90bpm under this regimen.   Please continue to take your Propanolol as instructed.  Please follow up with your PCP within one week of discharge for follow up and further management.    Diagnosis: TIA (transient ischemic attack)  Assessment and Plan of Treatment: You have a history of TIA (transient ischemic attack), which is a brief loss of neurological funciton. You described your symptoms to be similar to the previous episode, so a CT head was performed. It showed no signs of intracranial hemorrhage. Your symptoms improved.   Please follow up with your PCP within one week of discharge for follow up and further management.    Diagnosis: Hepatic steatosis  Assessment and Plan of Treatment: You were found to have elevated liver function markers in your admission blood work. An ultrasound of the right upper quadrant showed hepatic steatosis (fat buildup in the liver) and mild hepatomegaly (enlargement of the liver).  Please follow up with your PCP within one week of discharge for follow up blood work and further management. If your liver function tests remain elevated, we recommend follow up with a Hepatologist (liver specialist).       Diagnosis: Hematuria  Assessment and Plan of Treatment: During your admission, you reported that you had noticed blood in your urine. A urine analysis confirmed red blood cells in your urine, but no signs of infection.   Please follow up with your PCP within one week of discharge for follow up and further management. We also strongly recommend following up with a Urologist to complete the previously recommended cystoscopy.     PRINCIPAL DISCHARGE DIAGNOSIS  Diagnosis: COVID-19  Assessment and Plan of Treatment: You presented to the ED with a positive COVID test. A repeat PCR test confirmed a COVID infection. You were found to have a fever and received Tylenol. You received supportive care (medication mitigating your symptoms) with which your symptoms improved.   Please follow up with your PCP within one week of discharge for follow up and further management.        SECONDARY DISCHARGE DIAGNOSES  Diagnosis: Hepatic steatosis  Assessment and Plan of Treatment: You were found to have elevated liver function markers in your admission blood work. An ultrasound of the right upper quadrant showed hepatic steatosis (fat buildup in the liver) and mild hepatomegaly (enlargement of the liver).  Please follow up with your PCP within one week of discharge for follow up blood work and further management. If your liver function tests remain elevated, we recommend follow up with a Hepatologist (liver specialist).       Diagnosis: Hematuria  Assessment and Plan of Treatment: During your admission, you reported that you had noticed blood in your urine. A urine analysis confirmed red blood cells in your urine, but no signs of infection.   Please follow up with your PCP within one week of discharge for follow up and further management. We also strongly recommend following up with a Urologist to complete the previously recommended cystoscopy.    Diagnosis: Autonomic dysfunction  Assessment and Plan of Treatment: You have a history of autonomic dysfunction which causes an increased heart rate. You take Propanolol as needed at home. During this admission, you were seen by a Cardiologist (heart specialist) who recommended 5mg of Propanolol twice daily. During this admission, your heart rate was well controlled around 90bpm under this regimen.   Please continue to take your Propanolol as instructed.  Please follow up with your PCP within one week of discharge for follow up and further management.    Diagnosis: TIA (transient ischemic attack)  Assessment and Plan of Treatment: You have a history of TIA (transient ischemic attack), which is a brief loss of neurological funciton. You described your symptoms to be similar to the previous episode, so a CT head was performed. It showed no signs of intracranial hemorrhage. Your symptoms improved.   Please follow up with your PCP within one week of discharge for follow up and further management.

## 2024-09-07 NOTE — DISCHARGE NOTE PROVIDER - CARE PROVIDERS DIRECT ADDRESSES
,DirectAddress_Unknown ,[email or web address]

## 2024-09-07 NOTE — H&P ADULT - HISTORY OF PRESENT ILLNESS
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 Patient stated he started feeling chills with weakness and muscle aches yesterday morning. He was having palpitations and tachycardia as well as chest pain radiating to his left shoulder, similar to when he had his TIA in 2015. Patient took his PRN propanolol which usually resolves the palpitations and tachycardia but it did not work so he called his PCP who advised him to go to the ED for an EKG. Patient denies any dizziness ,headache, SOB, cough,  nausea, vomiting, diarrhea, abdominal pain,  lower extremity pain, or edema. Patient denies recent travel or sick contacts.

## 2024-09-07 NOTE — DISCHARGE NOTE PROVIDER - HOSPITAL COURSE
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 Patient stated he started feeling chills with weakness and muscle aches yesterday morning. He was having palpitations and tachycardia as well as chest pain radiating to his left shoulder, similar to when he had his TIA in 2015. Patient took his PRN propanolol which usually resolves the palpitations and tachycardia but it did not work so he called his PCP who advised him to go to the ED for an EKG. Patient denies any dizziness, headache, SOB, cough, nausea, vomiting, diarrhea, abdominal pain, lower extremity pain, or edema. Patient denies recent travel or sick contacts.     Patient was admitted for Sepsis and CVA r/o.  Initially received Zosyn in ED.  His was monitored off Antibiotics.  Pt was also evaluated by Cardiology for his tachycardia.    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 Patient stated he started feeling chills with weakness and muscle aches yesterday morning. He was having palpitations and tachycardia as well as chest pain radiating to his left shoulder, similar to when he had his TIA in 2015. Patient took his PRN propanolol which usually resolves the palpitations and tachycardia but it did not work so he called his PCP who advised him to go to the ED for an EKG. Patient denies any dizziness, headache, SOB, cough, nausea, vomiting, diarrhea, abdominal pain, lower extremity pain, or edema. Patient denies recent travel or sick contacts.     In the ED, the patient received IV fluids and single dose of Zosyn. A CXR and showed no abnormalitites. A CTA ruled out pulmonary embolism. A CT head showed no signs of intracranial hemorrhage. CT of the abdomen and pelvis showed light left renal collecting system fullness and left extrarenal pelvis of unclear significance without obstructing ureteral calculus identified. Urinalaysis was negative. RVP was positive for COVID. Patient admitted for sepsis likely 2/2 COVID.    After admission, the patient was intermittently febrile but saturated well on room air. The patient's symptoms were well managed with suppportive care  (tylenol, methocarbamol, mucinex). The patient was seen by Cardiology for his known history of tachycardia. 5mg of Propanolol bid was recommended, which controlled the patient's heart rate around 90bpm. The patient was found to have elevated LFTs (AST 64, ), for which a RUQ ultrasound was completed which revealed XXXX. LFTs were XXXXX on repeat measurements.    On the second day of admission, the patient endorsed dysuria which had started two days prior, accompanied by blood from the urethral meatus. Urine analysis revealed elevated RBCs, but no WBCs. Outpatient follow up with Urology was recommended for hematuria.    The patient was hemodynamically stable and ready for discharge. This is only a summary of the hospital course, please refer to the chart for additional detail. 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 Patient stated he started feeling chills with weakness and muscle aches one day prior to presenting to the ED. He endorsepalpitations and tachycardia as well as chest pain radiating to his left shoulder, similar to when he had his TIA in 2015. Patient took his PRN propanolol which usually resolves the palpitations and tachycardia but it did not work so he called his PCP who advised him to go to the ED for an EKG. Patient denies any dizziness, headache, SOB, cough, nausea, vomiting, diarrhea, abdominal pain, lower extremity pain, or edema. Patient denies recent travel or sick contacts.     In the ED, the patient received IV fluids and single dose of Zosyn. A CXR and showed no abnormalitites. A CTA ruled out pulmonary embolism. A CT head showed no signs of intracranial hemorrhage. CT of the abdomen and pelvis showed light left renal collecting system fullness and left extrarenal pelvis of unclear significance without obstructing ureteral calculus identified. Urinalaysis was negative. RVP was positive for COVID. Patient admitted for sepsis likely 2/2 COVID.    After admission, the patient was intermittently febrile but saturated well on room air. The patient's symptoms were well managed with supportive care  (tylenol, methocarbamol, mucinex). The patient was seen by Cardiology for his known history of tachycardia. 5mg of Propanolol bid was recommended, which controlled the patient's heart rate around 90bpm. The patient was found to have elevated LFTs (AST 64, ), for which a RUQ ultrasound was completed which revealed hepatic steatosis and mild hepatomegaly. LFTs were XXXXX on repeat measurements. The patient was recommended outpatient follow up with his PCP and a Hepatologist.    On the second day of admission, the patient endorsed dysuria which had started two days prior, accompanied by blood from the urethral meatus. Urine analysis revealed elevated RBCs, but no WBCs. Outpatient follow up with Urology was recommended for hematuria.    The patient was hemodynamically stable and ready for discharge. This is only a summary of the hospital course, please refer to the chart for additional detail. 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 Patient stated he started feeling chills with weakness and muscle aches one day prior to presenting to the ED. He endorsepalpitations and tachycardia as well as chest pain radiating to his left shoulder, similar to when he had his TIA in 2015. Patient took his PRN propanolol which usually resolves the palpitations and tachycardia but it did not work so he called his PCP who advised him to go to the ED for an EKG. Patient denies any dizziness, headache, SOB, cough, nausea, vomiting, diarrhea, abdominal pain, lower extremity pain, or edema. Patient denies recent travel or sick contacts.     In the ED, the patient received IV fluids and single dose of Zosyn. A CXR and showed no abnormalitites. A CTA ruled out pulmonary embolism. A CT head showed no signs of intracranial hemorrhage. CT of the abdomen and pelvis showed light left renal collecting system fullness and left extrarenal pelvis of unclear significance without obstructing ureteral calculus identified. Urinalaysis was negative. RVP was positive for COVID. Patient admitted for sepsis likely 2/2 COVID.    After admission, the patient was intermittently febrile but saturated well on room air. The patient's symptoms were well managed with supportive care  (tylenol, methocarbamol, mucinex). The patient was seen by Cardiology for his known history of tachycardia. 5mg of Propanolol bid was recommended, which controlled the patient's heart rate around 90bpm. The patient was found to have elevated LFTs (AST 64, ), for which a RUQ ultrasound was completed which revealed hepatic steatosis and mild hepatomegaly. LFTs were downtrending on repeat measurements. The patient was recommended outpatient follow up with his PCP and a Hepatologist.    On the second day of admission, the patient endorsed dysuria which had started two days prior, accompanied by blood from the urethral meatus. Urine analysis revealed elevated RBCs, but no WBCs. Outpatient follow up with Urology was recommended for hematuria.    The patient was hemodynamically stable and ready for discharge. This is only a summary of the hospital course, please refer to the chart for additional detail. 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 Patient stated he started feeling chills with weakness and muscle aches one day prior to presenting to the ED. He endorses palpitations and tachycardia as well as chest pain radiating to his left shoulder, similar to when he had his TIA in 2015. Patient took his PRN propanolol which usually resolves the palpitations and tachycardia but it did not work so he called his PCP who advised him to go to the ED for an EKG. Patient denies any dizziness, headache, SOB, cough, nausea, vomiting, diarrhea, abdominal pain, lower extremity pain, or edema. Patient denies recent travel or sick contacts.     In the ED, the patient received IV fluids and single dose of Zosyn. A CXR and showed no abnormalities. A CTA ruled out pulmonary embolism. A CT head showed no signs of intracranial hemorrhage. CT of the abdomen and pelvis showed light left renal collecting system fullness and left extrarenal pelvis of unclear significance without obstructing ureteral calculus identified. Urinalaysis was negative. RVP was positive for COVID. Patient admitted for sepsis likely 2/2 COVID.    After admission, the patient was intermittently febrile but saturated well on room air. The patient's symptoms were well managed with supportive care  (tylenol, methocarbamol, mucinex). The patient was seen by Cardiology for his known history of tachycardia. 5mg of Propanolol bid was recommended, which controlled the patient's heart rate around 90bpm. The patient was found to have elevated LFTs (AST 64, ), for which a RUQ ultrasound was completed which revealed hepatic steatosis and mild hepatomegaly. LFTs were downtrending on repeat measurements. The patient was recommended outpatient follow up with his PCP and a Hepatologist.    On the second day of admission, the patient endorsed dysuria which had started two days prior, accompanied by blood from the urethral meatus. Urine analysis revealed elevated RBCs, but no WBCs. Outpatient follow up with Urology was recommended for hematuria.    The patient was hemodynamically stable and ready for discharge. This is only a summary of the hospital course, please refer to the chart for additional detail.

## 2024-09-07 NOTE — CHART NOTE - NSCHARTNOTEFT_GEN_A_CORE
Patient seen and examined at bedside this morning. He complains of cough and fever in the morning. Feels cough is productive with white sputum. He also complains of  L sided shoulder/neck pain which he states has been ongoing since his issue with the chiropractor. He also notes lower back pain mainly on the R sided. Denies any dysuria or abdominal pain. He notes his HR is usually high when he gets up and says its like POTS and he has a hx of autonomic dysfunction since he got covid in 2020.    Labs, vitals and imaging reviewed as below. Tmax this morning noted 100.9, HR in 100s, tele noted with HR 110s in sinus tachycardia. RVP + covid   PE – NC/AT, tachycardic, lungs grossly clear, abd soft, ntnd, no LE edema or calf ttp, with R L spine paraspinal muscle with tightness and mild ttp     Vital Signs Last 24 Hrs  T(C): 38.3 (07 Sep 2024 11:57), Max: 38.4 (06 Sep 2024 21:18)  T(F): 100.9 (07 Sep 2024 11:57), Max: 101.1 (06 Sep 2024 21:18)  HR: 105 (07 Sep 2024 11:57) (105 - 139)  BP: 111/56 (07 Sep 2024 11:57) (102/65 - 120/79)  BP(mean): --  RR: 19 (07 Sep 2024 11:57) (14 - 19)  SpO2: 97% (07 Sep 2024 11:57) (94% - 100%)    Parameters below as of 07 Sep 2024 11:57  Patient On (Oxygen Delivery Method): room air                          12.5   7.09  )-----------( 289      ( 07 Sep 2024 12:13 )             38.9     09-07    142  |  110<H>  |  9   ----------------------------<  108<H>  3.8   |  26  |  x     Ca    9.0      07 Sep 2024 12:13  Mg     2.2     09-07    TPro  x   /  Alb  3.3<L>  /  TBili  x   /  DBili  x   /  AST  x   /  ALT  x   /  AlkPhos  x   09-07      A/P   43M, former smoker with pmh of POTS/autonomic dysfunction, hx of ?TIA after chiropractic manipulation (with baseline of unsteady gait, dizziness and myoclonic jerks), who was found to have covid at home, brought in for fevers and pain, admitted for sepsis 2/2 COVID infection.    #Sepsis 2/2 COVID infection   #Hx TIA iso chiro manipulation   #Autonomic dysfunction vs ?POTS   #Sinus tachycardia   #Back pain     -trend cbc, fever curve, procal   -monitor off abx for now   -guaifenesin for cough   -incentive padmini   -CT chest with no consolidations or signs of PE   -f/u Bcx   -isolation precautions   -monitor O2 sats, currently saturating well on room air, currently does not meet criteria for remdesivir or dexamethasone use   -tylenol prn for fever, pain   -motrin for back pain    -hot packs prn   -IVF hydration   -monitor HR, cardiology consulted   -can given home dose propranolol if HR persistently >120-130s   -CTH -no evidence of an acute transcortical infarction or hemorrhage, patient wants to get a second opinion from neuro, will be given outpatient office info   -dvt ppx Patient seen and examined at bedside this morning. He complains of cough and fever in the morning. Feels cough is productive with white sputum. He also complains of  L sided shoulder/neck pain which he states has been ongoing since his issue with the chiropractor. He also notes lower back pain mainly on the R sided. Denies any dysuria or abdominal pain. He notes his HR is usually high when he gets up and says its like POTS and he has a hx of autonomic dysfunction since he got covid in 2020.    Labs, vitals and imaging reviewed as below. Tmax this morning noted 100.9, HR in 100s, tele noted with HR 110s in sinus tachycardia. RVP + covid. wbc wnl, LFTs elevated.  PE – NC/AT, tachycardic, lungs grossly clear, abd soft, ntnd, no LE edema or calf ttp, with R L spine paraspinal muscle with tightness and mild ttp     Vital Signs Last 24 Hrs  T(C): 38.3 (07 Sep 2024 11:57), Max: 38.4 (06 Sep 2024 21:18)  T(F): 100.9 (07 Sep 2024 11:57), Max: 101.1 (06 Sep 2024 21:18)  HR: 105 (07 Sep 2024 11:57) (105 - 139)  BP: 111/56 (07 Sep 2024 11:57) (102/65 - 120/79)  BP(mean): --  RR: 19 (07 Sep 2024 11:57) (14 - 19)  SpO2: 97% (07 Sep 2024 11:57) (94% - 100%)    Parameters below as of 07 Sep 2024 11:57  Patient On (Oxygen Delivery Method): room air                          12.5   7.09  )-----------( 289      ( 07 Sep 2024 12:13 )             38.9     09-07    142  |  110<H>  |  9   ----------------------------<  108<H>  3.8   |  26  |  x     Ca    9.0      07 Sep 2024 12:13  Mg     2.2     09-07    TPro  x   /  Alb  3.3<L>  /  TBili  x   /  DBili  x   /  AST  x   /  ALT  x   /  AlkPhos  x   09-07      A/P   43M, former smoker with pmh of POTS/autonomic dysfunction, hx of ?TIA after chiropractic manipulation (with baseline of unsteady gait, dizziness and myoclonic jerks), who was found to have covid at home, brought in for fevers and pain, admitted for sepsis 2/2 COVID infection.    #Sepsis 2/2 COVID infection   #Hx TIA iso chiro manipulation   #Autonomic dysfunction vs ?POTS   #Sinus tachycardia   #Transaminitis  #Back pain     -trend cbc, fever curve, procal   -monitor off abx for now   -guaifenesin for cough   -incentive padmini   -CT chest with no consolidations or signs of PE   -f/u Bcx   -isolation precautions   -monitor O2 sats, currently saturating well on room air, currently does not meet criteria for remdesivir or dexamethasone use   -tylenol prn for fever, pain   -motrin for back pain    -hot packs prn   -IVF hydration   -monitor HR, cardiology consulted   -can given home dose propranolol if HR persistently >120-130s   -f/u LFTs, likely iso sepsis  -CTH -no evidence of an acute transcortical infarction or hemorrhage, patient wants to get a second opinion from neuro, will be given outpatient office info   -dvt ppx

## 2024-09-07 NOTE — PATIENT PROFILE ADULT - PATIENT REPRESENTATIVE: ( YOU CAN CHOOSE ANY PERSON THAT CAN ASSIST YOU WITH YOUR HEALTH CARE PREFERENCES, DOES NOT HAVE TO BE A SPOUSE, IMMEDIATE FAMILY OR SIGNIFICANT OTHER/PARTNER)
I called patient's mother Lesa.  No answer.    I left a VM stating that in regards to getting patient seen sooner I will look into potential [facility] appointments with NP and let her know what information I am given and if patient would be a candidate.  I also stated that if she is concerned about patient's safety and if patient is talking about harming herself it is strongly suggested patient go to ER or [facility] intake.     I asked for call back to clinic if needed and said I will call her with an update regarding sooner appointment.   declines

## 2024-09-07 NOTE — H&P ADULT - PROBLEM SELECTOR PLAN 3
h/o TIA 2/2 traumatic chiropractic manipulation  patient possibly having recurrance of symptoms  f/u CTH Impaired Gait h/o TIA 2/2 traumatic chiropractic manipulation  patient possibly having recurrance of symptoms ISO infection  f/u CTH

## 2024-09-07 NOTE — PATIENT PROFILE ADULT - FALL HARM RISK - HARM RISK INTERVENTIONS

## 2024-09-07 NOTE — ED PROVIDER NOTE - PROGRESS NOTE DETAILS
CT negative for pulmonary embolism, will admit to hospitalist, shared decision making with hospitalist including new CT head for concern for recrudescence per signout, added on CT abdomen pelvis noncontrast, antibiotics added for concern for potential sepsis.

## 2024-09-08 LAB
ALBUMIN SERPL ELPH-MCNC: 3.6 G/DL — SIGNIFICANT CHANGE UP (ref 3.5–5)
ALP SERPL-CCNC: 118 U/L — SIGNIFICANT CHANGE UP (ref 40–120)
ALT FLD-CCNC: 123 U/L DA — HIGH (ref 10–60)
ANION GAP SERPL CALC-SCNC: 6 MMOL/L — SIGNIFICANT CHANGE UP (ref 5–17)
AST SERPL-CCNC: 68 U/L — HIGH (ref 10–40)
BILIRUB SERPL-MCNC: 0.3 MG/DL — SIGNIFICANT CHANGE UP (ref 0.2–1.2)
BUN SERPL-MCNC: 11 MG/DL — SIGNIFICANT CHANGE UP (ref 7–18)
CALCIUM SERPL-MCNC: 8.7 MG/DL — SIGNIFICANT CHANGE UP (ref 8.4–10.5)
CHLORIDE SERPL-SCNC: 109 MMOL/L — HIGH (ref 96–108)
CO2 SERPL-SCNC: 23 MMOL/L — SIGNIFICANT CHANGE UP (ref 22–31)
CREAT SERPL-MCNC: 0.9 MG/DL — SIGNIFICANT CHANGE UP (ref 0.5–1.3)
EGFR: 109 ML/MIN/1.73M2 — SIGNIFICANT CHANGE UP
GLUCOSE SERPL-MCNC: 100 MG/DL — HIGH (ref 70–99)
HCT VFR BLD CALC: 39.1 % — SIGNIFICANT CHANGE UP (ref 39–50)
HGB BLD-MCNC: 12.5 G/DL — LOW (ref 13–17)
MAGNESIUM SERPL-MCNC: 2.1 MG/DL — SIGNIFICANT CHANGE UP (ref 1.6–2.6)
MCHC RBC-ENTMCNC: 26.7 PG — LOW (ref 27–34)
MCHC RBC-ENTMCNC: 32 GM/DL — SIGNIFICANT CHANGE UP (ref 32–36)
MCV RBC AUTO: 83.4 FL — SIGNIFICANT CHANGE UP (ref 80–100)
NRBC # BLD: 0 /100 WBCS — SIGNIFICANT CHANGE UP (ref 0–0)
PHOSPHATE SERPL-MCNC: 3.5 MG/DL — SIGNIFICANT CHANGE UP (ref 2.5–4.5)
PLATELET # BLD AUTO: 276 K/UL — SIGNIFICANT CHANGE UP (ref 150–400)
POTASSIUM SERPL-MCNC: 3.7 MMOL/L — SIGNIFICANT CHANGE UP (ref 3.5–5.3)
POTASSIUM SERPL-SCNC: 3.7 MMOL/L — SIGNIFICANT CHANGE UP (ref 3.5–5.3)
PROCALCITONIN SERPL-MCNC: 0.08 NG/ML — SIGNIFICANT CHANGE UP (ref 0.02–0.1)
PROT SERPL-MCNC: 6.9 G/DL — SIGNIFICANT CHANGE UP (ref 6–8.3)
RBC # BLD: 4.69 M/UL — SIGNIFICANT CHANGE UP (ref 4.2–5.8)
RBC # FLD: 13.2 % — SIGNIFICANT CHANGE UP (ref 10.3–14.5)
SODIUM SERPL-SCNC: 138 MMOL/L — SIGNIFICANT CHANGE UP (ref 135–145)
WBC # BLD: 6.17 K/UL — SIGNIFICANT CHANGE UP (ref 3.8–10.5)
WBC # FLD AUTO: 6.17 K/UL — SIGNIFICANT CHANGE UP (ref 3.8–10.5)

## 2024-09-08 PROCEDURE — 99233 SBSQ HOSP IP/OBS HIGH 50: CPT | Mod: GC

## 2024-09-08 RX ORDER — SENNA 187 MG
2 TABLET ORAL AT BEDTIME
Refills: 0 | Status: DISCONTINUED | OUTPATIENT
Start: 2024-09-09 | End: 2024-09-10

## 2024-09-08 RX ORDER — SENNA 187 MG
2 TABLET ORAL ONCE
Refills: 0 | Status: COMPLETED | OUTPATIENT
Start: 2024-09-08 | End: 2024-09-08

## 2024-09-08 RX ORDER — METHOCARBAMOL 750 MG/1
500 TABLET, FILM COATED ORAL
Refills: 0 | Status: COMPLETED | OUTPATIENT
Start: 2024-09-08 | End: 2024-09-10

## 2024-09-08 RX ORDER — POLYETHYLENE GLYCOL 3350 17 G/17G
17 POWDER, FOR SOLUTION ORAL DAILY
Refills: 0 | Status: DISCONTINUED | OUTPATIENT
Start: 2024-09-08 | End: 2024-09-10

## 2024-09-08 RX ADMIN — POLYETHYLENE GLYCOL 3350 17 GRAM(S): 17 POWDER, FOR SOLUTION ORAL at 11:49

## 2024-09-08 RX ADMIN — Medication 1 LOZENGE: at 14:38

## 2024-09-08 RX ADMIN — ENOXAPARIN SODIUM 40 MILLIGRAM(S): 100 INJECTION SUBCUTANEOUS at 11:48

## 2024-09-08 RX ADMIN — Medication 1 LOZENGE: at 21:16

## 2024-09-08 RX ADMIN — ACETAMINOPHEN 650 MILLIGRAM(S): 325 TABLET ORAL at 06:50

## 2024-09-08 RX ADMIN — Medication 1 LOZENGE: at 05:57

## 2024-09-08 RX ADMIN — ACETAMINOPHEN 650 MILLIGRAM(S): 325 TABLET ORAL at 05:57

## 2024-09-08 RX ADMIN — ACETAMINOPHEN 650 MILLIGRAM(S): 325 TABLET ORAL at 15:43

## 2024-09-08 RX ADMIN — METHOCARBAMOL 500 MILLIGRAM(S): 750 TABLET, FILM COATED ORAL at 18:40

## 2024-09-08 RX ADMIN — Medication 2 TABLET(S): at 11:49

## 2024-09-08 RX ADMIN — ACETAMINOPHEN 650 MILLIGRAM(S): 325 TABLET ORAL at 17:48

## 2024-09-08 NOTE — PROGRESS NOTE ADULT - SUBJECTIVE AND OBJECTIVE BOX
PGY-1 Progress Note discussed with attending    Paulie Mcconnell via Teams TILL 5:00 PM  PLEASE CONTACT ON CALL TEAM:  - On Call Team (Please refer to [facility]) FROM 5:00 PM - 8:30PM  - Nightfloat Team FROM 8:30 -7:30 AM    CHIEF COMPLAINT & BRIEF HOSPITAL COURSE:    INTERVAL HPI/OVERNIGHT EVENTS:       REVIEW OF SYSTEMS:  CONSTITUTIONAL: No fever, weight loss, or fatigue  RESPIRATORY: No cough, wheezing, chills or hemoptysis; No shortness of breath  CARDIOVASCULAR: No chest pain, palpitations, dizziness, or leg swelling  GASTROINTESTINAL: No abdominal pain. No nausea, vomiting, or hematemesis; No diarrhea or constipation. No melena or hematochezia.  GENITOURINARY: No dysuria or hematuria, urinary frequency  NEUROLOGICAL: No headaches, memory loss, loss of strength, numbness, or tremors  SKIN: No itching, burning, rashes, or lesions     Vital Signs Last 24 Hrs  T(C): 37.6 (08 Sep 2024 07:25), Max: 38.7 (08 Sep 2024 04:48)  T(F): 99.7 (08 Sep 2024 07:25), Max: 101.7 (08 Sep 2024 04:48)  HR: 94 (08 Sep 2024 07:25) (94 - 105)  BP: 106/69 (08 Sep 2024 07:25) (98/62 - 119/80)  BP(mean): --  RR: 18 (08 Sep 2024 07:25) (18 - 19)  SpO2: 97% (08 Sep 2024 07:25) (96% - 99%)    Parameters below as of 08 Sep 2024 07:25  Patient On (Oxygen Delivery Method): room air        PHYSICAL EXAMINATION:  GENERAL: NAD, well built  HEAD:  Atraumatic, Normocephalic  EYES:  conjunctiva and sclera clear  NECK: Supple, No JVD, Normal thyroid  CHEST/LUNG: Clear to auscultation. Clear to percussion bilaterally; No rales, rhonchi, wheezing, or rubs  HEART: Regular rate and rhythm; No murmurs, rubs, or gallops  ABDOMEN: Soft, Nontender, Nondistended; Bowel sounds present  NERVOUS SYSTEM:  Alert & Oriented X3,    EXTREMITIES:  2+ Peripheral Pulses, No clubbing, cyanosis, or edema  SKIN: warm dry                          12.5   6.17  )-----------( 276      ( 08 Sep 2024 07:58 )             39.1     09-08    138  |  109<H>  |  11  ----------------------------<  100<H>  3.7   |  23  |  0.90    Ca    8.7      08 Sep 2024 07:58  Phos  3.5     09-08  Mg     2.1     09-08    TPro  6.9  /  Alb  3.6  /  TBili  0.3  /  DBili  x   /  AST  68<H>  /  ALT  123<H>  /  AlkPhos  118  09-08    LIVER FUNCTIONS - ( 08 Sep 2024 07:58 )  Alb: 3.6 g/dL / Pro: 6.9 g/dL / ALK PHOS: 118 U/L / ALT: 123 U/L DA / AST: 68 U/L / GGT: x               PT/INR - ( 06 Sep 2024 22:00 )   PT: 12.3 sec;   INR: 1.08 ratio         PTT - ( 06 Sep 2024 22:00 )  PTT:35.7 sec    CAPILLARY BLOOD GLUCOSE      RADIOLOGY & ADDITIONAL TESTS:                   PGY-1 Progress Note discussed with attending    Paulie Mcconnell via Teams TILL 5:00 PM  PLEASE CONTACT ON CALL TEAM:  - On Call Team (Please refer to [facility]) FROM 5:00 PM - 8:30PM  - Nightfloat Team FROM 8:30 -7:30 AM    CHIEF COMPLAINT & BRIEF HOSPITAL COURSE:  43M PMHx TIA 2/2 to traumatic chiropractic manipulation in 2015, ischemic brain injury, autonomic dysfunction, bedbound at baseline presented w/ c/o positive COVID test and persistent tachycardia. Patient admitted for Sepsis and CVA r/o    INTERVAL HPI/OVERNIGHT EVENTS:   Patient with muscle aches during exam in the AM. Otherwise no new complaints. Plan discussed with patient, states understanding.    REVIEW OF SYSTEMS:  CONSTITUTIONAL: Fatigue, muscle aches, No fever or weight loss  RESPIRATORY: Cough, mild shortness of breath No wheezing, chills or hemoptysis  CARDIOVASCULAR: No chest pain, palpitations, dizziness, or leg swelling  GASTROINTESTINAL: No abdominal pain. No nausea, vomiting, or hematemesis; No diarrhea or constipation. No melena or hematochezia.  GENITOURINARY: No dysuria or hematuria, urinary frequency  NEUROLOGICAL: No headaches, memory loss, loss of strength, numbness, or tremors  SKIN: No itching, burning, rashes, or lesions     Vital Signs Last 24 Hrs  T(C): 37.6 (08 Sep 2024 07:25), Max: 38.7 (08 Sep 2024 04:48)  T(F): 99.7 (08 Sep 2024 07:25), Max: 101.7 (08 Sep 2024 04:48)  HR: 94 (08 Sep 2024 07:25) (94 - 105)  BP: 106/69 (08 Sep 2024 07:25) (98/62 - 119/80)  BP(mean): --  RR: 18 (08 Sep 2024 07:25) (18 - 19)  SpO2: 97% (08 Sep 2024 07:25) (96% - 99%)    Parameters below as of 08 Sep 2024 07:25  Patient On (Oxygen Delivery Method): room air        PHYSICAL EXAMINATION:  GENERAL: Generalized muscle pain  HEAD:  Atraumatic, Normocephalic  EYES:  conjunctiva and sclera clear  CHEST/LUNG: Clear to auscultation. No rales, rhonchi, wheezing, or rubs  HEART: Regular rate and rhythm; No murmurs, rubs, or gallops  ABDOMEN: Soft, Nontender, Nondistended; Bowel sounds present  NERVOUS SYSTEM:  Alert & Oriented X3,    EXTREMITIES:  2+ Peripheral Pulses, No clubbing, cyanosis, or edema  SKIN: warm dry                          12.5   6.17  )-----------( 276      ( 08 Sep 2024 07:58 )             39.1     09-08    138  |  109<H>  |  11  ----------------------------<  100<H>  3.7   |  23  |  0.90    Ca    8.7      08 Sep 2024 07:58  Phos  3.5     09-08  Mg     2.1     09-08    TPro  6.9  /  Alb  3.6  /  TBili  0.3  /  DBili  x   /  AST  68<H>  /  ALT  123<H>  /  AlkPhos  118  09-08    LIVER FUNCTIONS - ( 08 Sep 2024 07:58 )  Alb: 3.6 g/dL / Pro: 6.9 g/dL / ALK PHOS: 118 U/L / ALT: 123 U/L DA / AST: 68 U/L / GGT: x               PT/INR - ( 06 Sep 2024 22:00 )   PT: 12.3 sec;   INR: 1.08 ratio         PTT - ( 06 Sep 2024 22:00 )  PTT:35.7 sec    CAPILLARY BLOOD GLUCOSE      RADIOLOGY & ADDITIONAL TESTS:      CTH negative

## 2024-09-08 NOTE — PROGRESS NOTE ADULT - ATTENDING COMMENTS
Patient seen and examined at bedside this morning. Denies any shortness of breath or palpitations. Still with fevers, and productive cough. Also notes constipation. Still with myalgias, through his body. States he will try to get up and walk around his room a bit with his cane.  Labs, vitals and imaging reviewed as above. Tmax this morning noted 101.7F, HR in 100s, tele noted with HR 110s in sinus tachycardia. RVP + covid. wbc wnl, LFTs uptrended. PE – NC/AT, tachycardic, lungs grossly clear, abd soft, ntnd, no LE edema or calf ttp      A/P   43M, former smoker with pmh of POTS/autonomic dysfunction, hx of ?TIA after chiropractic manipulation (with baseline of unsteady gait, dizziness and myoclonic jerks), who was found to have covid at home, brought in for fevers and pain, admitted for sepsis 2/2 COVID infection.    #Sepsis 2/2 COVID infection   #Hx TIA iso chiro manipulation   #Autonomic dysfunction vs ?POTS   #Sinus tachycardia   #Transaminitis  #Back pain     -trend cbc, fever curve, f/u procal   -monitor off abx for now   -guaifenesin for cough   -incentive spirometer given to patient  -CT chest with no consolidations or signs of PE   -f/u Bcx -ngtd  -isolation precautions   -monitor O2 sats, currently saturating well on room air, currently does not meet criteria for remdesivir or dexamethasone use   -tylenol prn for fever, pain   -trial of roboxin for muscle pains   -hot packs prn   -IVF hydration   -monitor HR, cardiology consulted, resumed on propranolol at 5mg q12hr  -f/u LFTs, likely iso sepsis, if worsening will consider US  -CTH -no evidence of an acute transcortical infarction or hemorrhage, patient wants to get a second opinion from neuro, will be given outpatient office info   -dvt ppx Patient seen and examined at bedside this morning. Denies any shortness of breath or palpitations. Still with fevers, and productive cough. Also notes constipation. Still with myalgias, through his body. States he will try to get up and walk around his room a bit with his cane.  Labs, vitals and imaging reviewed as above. Tmax this morning noted 101.7F, HR in 100s, tele noted with HR 110s in sinus tachycardia. RVP + covid. wbc wnl, LFTs uptrended. PE – NC/AT, tachycardic, lungs grossly clear, abd soft, ntnd, no LE edema or calf ttp      A/P   43M, former smoker with pmh of POTS/autonomic dysfunction, hx of ?TIA after chiropractic manipulation (with baseline of unsteady gait, dizziness and myoclonic jerks), who was found to have covid at home, brought in for fevers and pain, admitted for sepsis 2/2 COVID infection.    #Sepsis 2/2 COVID infection   #Hx TIA iso chiro manipulation   #Autonomic dysfunction vs ?POTS   #Sinus tachycardia   #Transaminitis  #Myalgias  #Constipation    -trend cbc, fever curve, f/u procal   -monitor off abx for now   -guaifenesin for cough   -incentive spirometer given to patient  -CT chest with no consolidations or signs of PE   -f/u Bcx -ngtd  -isolation precautions   -monitor O2 sats, currently saturating well on room air, currently does not meet criteria for remdesivir or dexamethasone use   -tylenol prn for fever, pain   -trial of roboxin for muscle pains   -hot packs prn   -IVF hydration   -monitor HR, cardiology consulted, resumed on propranolol at 5mg q12hr  -f/u LFTs, likely iso sepsis, if worsening will consider US  -bowel regimen  -CTH -no evidence of an acute transcortical infarction or hemorrhage, patient wants to get a second opinion from neuro, will be given outpatient office info   -dvt ppx Patient seen and examined at bedside this morning. Denies any shortness of breath or palpitations. Still with fevers, and productive cough. Also notes constipation. Still with myalgias, through his body. States he will try to get up and walk around his room a bit with his cane.  Labs, vitals and imaging reviewed as above. Tmax this morning noted 101.7F, HR in 100s, tele noted with HR 110s in sinus tachycardia. RVP + covid. wbc wnl, LFTs uptrended. PE – NC/AT, tachycardic, lungs grossly clear, abd soft, ntnd, no LE edema or calf ttp      A/P   43M, former smoker with pmh of POTS/autonomic dysfunction, hx of ?TIA after chiropractic manipulation (with baseline of unsteady gait, dizziness and myoclonic jerks), who was found to have covid at home, brought in for fevers and pain, admitted for sepsis 2/2 COVID infection.    #Sepsis 2/2 COVID infection   #Hx TIA iso chiro manipulation   #Autonomic dysfunction vs ?POTS   #Sinus tachycardia   #Transaminitis  #Myalgias  #Constipation    -trend cbc, fever curve, f/u procal   -monitor off abx for now   -guaifenesin for cough   -incentive spirometer given to patient  -CT chest with no consolidations or signs of PE   -f/u Bcx -ngtd  -isolation precautions   -monitor O2 sats, currently saturating well on room air, currently does not meet criteria for remdesivir or dexamethasone use   -tylenol prn for fever, pain   -trial of roboxin for muscle pains   -hot packs prn   -IVF hydration   -monitor HR, cardiology consulted, resumed on propranolol at 5mg q12hr  -f/u LFTs, likely iso sepsis, if worsening will consider US  -bowel regimen  -CTH -no evidence of an acute transcortical infarction or hemorrhage, patient wants to get a second opinion from neuro, will be given outpatient office info   -dvt ppx  -discussed with both patient and his father at bedside

## 2024-09-08 NOTE — PROGRESS NOTE ADULT - PROBLEM SELECTOR PLAN 1
likely 2/2 COVID  f/u CTAP for potential alternative source of infection  s/p zosyn in ED  hold ABx unless source identified  supportive measures  c/w IVF likely 2/2 COVID  CT chest negative   s/p zosyn in ED  hold ABx unless source identified  supportive measures  started methocarbamol for muscle aches  Monitor LFTs daily   c/w IVF

## 2024-09-08 NOTE — PROGRESS NOTE ADULT - PROBLEM SELECTOR PLAN 3
h/o TIA 2/2 traumatic chiropractic manipulation  patient possibly having recurrance of symptoms ISO infection  f/u CTH h/o TIA 2/2 traumatic chiropractic manipulation  patient possibly having recurrance of symptoms ISO infection  CTH negative

## 2024-09-08 NOTE — PROGRESS NOTE ADULT - SUBJECTIVE AND OBJECTIVE BOX
PATIENT SEEN AND EXAMINED BY JOSE GUILLEN M.D. ON :- 9/8/24  DATE OF SERVICE:  9/8/24           Interim events noted,Labs ,Radiological studies and Cardiology tests reviewed .    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 Patient stated he started feeling chills with weakness and muscle aches yesterday morning. He was having palpitations and tachycardia as well as chest pain radiating to his left shoulder, similar to when he had his TIA in 2015. Patient took his PRN propanolol which usually resolves the palpitations and tachycardia but it did not work so he called his PCP who advised him to go to the ED for an EKG. Patient denies any dizziness ,headache, SOB, cough,  nausea, vomiting, diarrhea, abdominal pain,  lower extremity pain, or edema. Patient denies recent travel or sick contacts.  (07 Sep 2024 06:27)      PAST MEDICAL & SURGICAL HISTORY:      PREVIOUS DIAGNOSTIC TESTING:      ECHO  FINDINGS:    STRESS  FINDINGS:    CATHETERIZATION  FINDINGS:    MEDICATIONS  (STANDING):  benzocaine/menthol Lozenge 1 Lozenge Oral three times a day  enoxaparin Injectable 40 milliGRAM(s) SubCutaneous every 24 hours  guaiFENesin  milliGRAM(s) Oral every 12 hours  methocarbamol 500 milliGRAM(s) Oral two times a day  polyethylene glycol 3350 17 Gram(s) Oral daily  propranolol 5 milliGRAM(s) Oral every 12 hours  sodium chloride 0.9%. 1000 milliLiter(s) (100 mL/Hr) IV Continuous <Continuous>    MEDICATIONS  (PRN):  acetaminophen     Tablet .. 650 milliGRAM(s) Oral every 6 hours PRN Temp greater or equal to 38C (100.4F), Mild Pain (1 - 3)  aluminum hydroxide/magnesium hydroxide/simethicone Suspension 30 milliLiter(s) Oral every 4 hours PRN Dyspepsia  melatonin 3 milliGRAM(s) Oral at bedtime PRN Insomnia  ondansetron Injectable 4 milliGRAM(s) IV Push every 8 hours PRN Nausea and/or Vomiting  sodium chloride 0.65% Nasal 1 Spray(s) Both Nostrils four times a day PRN Nasal Congestion      FAMILY HISTORY:      SOCIAL HISTORY:    CIGARETTES:    ALCOHOL:    REVIEW OF SYSTEMS:  CONSTITUTIONAL: No fever, weight loss, or fatigue  EYES: No eye pain, visual disturbances, or discharge  ENMT:  No difficulty hearing, tinnitus, vertigo; No sinus or throat pain  NECK: No pain or stiffness  RESPIRATORY: No cough, wheezing, chills or hemoptysis; No shortness of breath  CARDIOVASCULAR: No chest pain, palpitations, dizziness, or leg swelling  GASTROINTESTINAL: No abdominal or epigastric pain. No nausea, vomiting, or hematemesis; No diarrhea or constipation. No melena or hematochezia.  GENITOURINARY: No dysuria, frequency, hematuria, or incontinence  NEUROLOGICAL: No headaches, memory loss, loss of strength, numbness, or tremors  SKIN: No itching, burning, rashes, or lesions   LYMPH NODES: No enlarged glands  ENDOCRINE: No heat or cold intolerance; No hair loss  MUSCULOSKELETAL: No joint pain or swelling; No muscle, back, or extremity pain  PSYCHIATRIC: No depression, anxiety, mood swings, or difficulty sleeping  HEME/LYMPH: No easy bruising, or bleeding gums  ALLERY AND IMMUNOLOGIC: No hives or eczema    Vital Signs Last 24 Hrs  T(C): 36.9 (08 Sep 2024 20:29), Max: 38.7 (08 Sep 2024 04:48)  T(F): 98.4 (08 Sep 2024 20:29), Max: 101.7 (08 Sep 2024 04:48)  HR: 99 (08 Sep 2024 20:29) (94 - 105)  BP: 108/78 (08 Sep 2024 20:29) (100/64 - 111/73)  BP(mean): 78 (08 Sep 2024 18:37) (78 - 78)  RR: 18 (08 Sep 2024 20:29) (18 - 19)  SpO2: 98% (08 Sep 2024 20:29) (97% - 99%)    Parameters below as of 08 Sep 2024 20:29  Patient On (Oxygen Delivery Method): room air          PHYSICAL EXAM:  GENERAL: NAD, well-groomed, well-developed  HEAD:  Atraumatic, Normocephalic  EYES: EOMI, PERRLA, conjunctiva and sclera clear  ENMT: No tonsillar erythema, exudates, or enlargement; Moist mucous membranes, Good dentition, No lesions  NECK: Supple, No JVD, Normal thyroid  NERVOUS SYSTEM:  Alert & Oriented X3, Good concentration; Motor Strength 5/5 B/L upper and lower extremities; DTRs 2+ intact and symmetric  CHEST/LUNG: Clear to percussion bilaterally; No rales, rhonchi, wheezing, or rubs  HEART: Regular rate and rhythm; No murmurs, rubs, or gallops  ABDOMEN: Soft, Nontender, Nondistended; Bowel sounds present  EXTREMITIES:  2+ Peripheral Pulses, No clubbing, cyanosis, or edema  LYMPH: No lymphadenopathy noted  SKIN: No rashes or lesions      INTERPRETATION OF TELEMETRY:    ECG:    [facility]     LABS:                        12.5   6.17  )-----------( 276      ( 08 Sep 2024 07:58 )             39.1     09-08    138  |  109<H>  |  11  ----------------------------<  100<H>  3.7   |  23  |  0.90    Ca    8.7      08 Sep 2024 07:58  Phos  3.5     09-08  Mg     2.1     09-08    TPro  6.9  /  Alb  3.6  /  TBili  0.3  /  DBili  x   /  AST  68<H>  /  ALT  123<H>  /  AlkPhos  118  09-08        PT/INR - ( 06 Sep 2024 22:00 )   PT: 12.3 sec;   INR: 1.08 ratio         PTT - ( 06 Sep 2024 22:00 )  PTT:35.7 sec  Urinalysis Basic - ( 08 Sep 2024 07:58 )    Color: x / Appearance: x / SG: x / pH: x  Gluc: 100 mg/dL / Ketone: x  / Bili: x / Urobili: x   Blood: x / Protein: x / Nitrite: x   Leuk Esterase: x / RBC: x / WBC x   Sq Epi: x / Non Sq Epi: x / Bacteria: x      Lipid Panel:   I&O's Summary      RADIOLOGY & ADDITIONAL STUDIES:

## 2024-09-08 NOTE — PROGRESS NOTE ADULT - PROBLEM SELECTOR PLAN 4
2/2 traumatic chiropractic manipulation  on PRN propanolol 2.5 at home  consider cardio consult if persisitent tachy 2/2 traumatic chiropractic manipulation  on PRN propanolol 2.5 at home  consider cardio consult if persistently tachy Home

## 2024-09-09 LAB
ALBUMIN SERPL ELPH-MCNC: 3.4 G/DL — LOW (ref 3.5–5)
ALP SERPL-CCNC: 114 U/L — SIGNIFICANT CHANGE UP (ref 40–120)
ALT FLD-CCNC: 129 U/L DA — HIGH (ref 10–60)
ANION GAP SERPL CALC-SCNC: 6 MMOL/L — SIGNIFICANT CHANGE UP (ref 5–17)
APPEARANCE UR: CLEAR — SIGNIFICANT CHANGE UP
AST SERPL-CCNC: 64 U/L — HIGH (ref 10–40)
BILIRUB SERPL-MCNC: 0.2 MG/DL — SIGNIFICANT CHANGE UP (ref 0.2–1.2)
BILIRUB UR-MCNC: NEGATIVE — SIGNIFICANT CHANGE UP
BUN SERPL-MCNC: 10 MG/DL — SIGNIFICANT CHANGE UP (ref 7–18)
CALCIUM SERPL-MCNC: 8.5 MG/DL — SIGNIFICANT CHANGE UP (ref 8.4–10.5)
CHLORIDE SERPL-SCNC: 106 MMOL/L — SIGNIFICANT CHANGE UP (ref 96–108)
CO2 SERPL-SCNC: 26 MMOL/L — SIGNIFICANT CHANGE UP (ref 22–31)
COLOR SPEC: YELLOW — SIGNIFICANT CHANGE UP
CREAT SERPL-MCNC: 0.8 MG/DL — SIGNIFICANT CHANGE UP (ref 0.5–1.3)
DIFF PNL FLD: ABNORMAL
EGFR: 113 ML/MIN/1.73M2 — SIGNIFICANT CHANGE UP
GLUCOSE SERPL-MCNC: 106 MG/DL — HIGH (ref 70–99)
GLUCOSE UR QL: NEGATIVE MG/DL — SIGNIFICANT CHANGE UP
HCT VFR BLD CALC: 39.6 % — SIGNIFICANT CHANGE UP (ref 39–50)
HGB BLD-MCNC: 13.1 G/DL — SIGNIFICANT CHANGE UP (ref 13–17)
KETONES UR-MCNC: NEGATIVE MG/DL — SIGNIFICANT CHANGE UP
LEUKOCYTE ESTERASE UR-ACNC: NEGATIVE — SIGNIFICANT CHANGE UP
MAGNESIUM SERPL-MCNC: 2.1 MG/DL — SIGNIFICANT CHANGE UP (ref 1.6–2.6)
MCHC RBC-ENTMCNC: 26.8 PG — LOW (ref 27–34)
MCHC RBC-ENTMCNC: 33.1 GM/DL — SIGNIFICANT CHANGE UP (ref 32–36)
MCV RBC AUTO: 81.1 FL — SIGNIFICANT CHANGE UP (ref 80–100)
NITRITE UR-MCNC: NEGATIVE — SIGNIFICANT CHANGE UP
NRBC # BLD: 0 /100 WBCS — SIGNIFICANT CHANGE UP (ref 0–0)
PH UR: 5.5 — SIGNIFICANT CHANGE UP (ref 5–8)
PHOSPHATE SERPL-MCNC: 3.7 MG/DL — SIGNIFICANT CHANGE UP (ref 2.5–4.5)
PLATELET # BLD AUTO: 278 K/UL — SIGNIFICANT CHANGE UP (ref 150–400)
POTASSIUM SERPL-MCNC: 3.8 MMOL/L — SIGNIFICANT CHANGE UP (ref 3.5–5.3)
POTASSIUM SERPL-SCNC: 3.8 MMOL/L — SIGNIFICANT CHANGE UP (ref 3.5–5.3)
PROT SERPL-MCNC: 6.9 G/DL — SIGNIFICANT CHANGE UP (ref 6–8.3)
PROT UR-MCNC: NEGATIVE MG/DL — SIGNIFICANT CHANGE UP
RBC # BLD: 4.88 M/UL — SIGNIFICANT CHANGE UP (ref 4.2–5.8)
RBC # FLD: 13.2 % — SIGNIFICANT CHANGE UP (ref 10.3–14.5)
RBC CASTS # UR COMP ASSIST: 5 /HPF — HIGH (ref 0–4)
SODIUM SERPL-SCNC: 138 MMOL/L — SIGNIFICANT CHANGE UP (ref 135–145)
SP GR SPEC: 1.01 — SIGNIFICANT CHANGE UP (ref 1–1.03)
UROBILINOGEN FLD QL: 0.2 MG/DL — SIGNIFICANT CHANGE UP (ref 0.2–1)
WBC # BLD: 4.92 K/UL — SIGNIFICANT CHANGE UP (ref 3.8–10.5)
WBC # FLD AUTO: 4.92 K/UL — SIGNIFICANT CHANGE UP (ref 3.8–10.5)
WBC UR QL: 1 /HPF — SIGNIFICANT CHANGE UP (ref 0–5)

## 2024-09-09 PROCEDURE — 76705 ECHO EXAM OF ABDOMEN: CPT | Mod: 26

## 2024-09-09 PROCEDURE — 99233 SBSQ HOSP IP/OBS HIGH 50: CPT | Mod: GC

## 2024-09-09 RX ADMIN — ACETAMINOPHEN 650 MILLIGRAM(S): 325 TABLET ORAL at 07:11

## 2024-09-09 RX ADMIN — Medication 1 LOZENGE: at 21:18

## 2024-09-09 RX ADMIN — Medication 1 LOZENGE: at 14:17

## 2024-09-09 RX ADMIN — ACETAMINOPHEN 650 MILLIGRAM(S): 325 TABLET ORAL at 06:09

## 2024-09-09 RX ADMIN — POLYETHYLENE GLYCOL 3350 17 GRAM(S): 17 POWDER, FOR SOLUTION ORAL at 12:03

## 2024-09-09 RX ADMIN — ENOXAPARIN SODIUM 40 MILLIGRAM(S): 100 INJECTION SUBCUTANEOUS at 12:05

## 2024-09-09 RX ADMIN — Medication 1 LOZENGE: at 05:59

## 2024-09-09 NOTE — PROGRESS NOTE ADULT - PROBLEM SELECTOR PLAN 5
S: 55 year old female seen bedside s/p 1st ray amputation on 4/26. Patient denies F/C/N/V/SOB.    DARIEL:   left foot  Vasc: DP/PT 1/4; TG: WNL; CFT < 5 sec on 3-5 digits   Derm: Surgical site well coapted, no dehiscence no drainage, sutures intact. no purulence noted, no hematoma noted upon probing the surgical site. strikethrough noted on post-op dressing (from walking on surgical site), but no active bleeding noted at this time; ecchymosis about the entire rearfoot  Neuro: diminished protective sensation    A: s/p 1st ray amputation of the left foot on 4/26    P: Chart reviewed and patient evaluated  Applied xeroform/DSD to surgical site.   Culture and pathology pending: few strep noted  continue iv abx.   Patient to be Heel weightbearing with surgical shoe to the LLE.  Patient may be podiatrically discharged once medically stable and f/u with Dr. Sanchez as outpatient  Podiatry will follow while in house.
lovenox
lovenox

## 2024-09-09 NOTE — PROGRESS NOTE ADULT - ASSESSMENT
Patient seen and examined at bedside this morning. Denies any shortness of breath or palpitations. No further fevers this morning, but he noted some small amount of blood when he wiped after urination. He notes that had happened in the past before and urologist had recommended cystoscopy. He also notes his father is sick today and may not be able to get him if he is discharged and asks if possible for the morning.   Labs, vitals and imaging reviewed as above. afebrile this morning, HR in 100s, tele noted with HR 90-100s sinus tachycardia. wbc wnl, LFTs elevated. PE – NC/AT, tachycardic, lungs grossly clear, abd soft, ntnd, no LE edema or calf ttp      A/P   43M, former smoker with pmh of POTS/autonomic dysfunction, hx of ?TIA after chiropractic manipulation (with baseline of unsteady gait, dizziness and myoclonic jerks), who was found to have covid at home, brought in for fevers and pain, admitted for sepsis 2/2 COVID infection.    #Sepsis 2/2 COVID infection   #Hx TIA iso chiro manipulation   #Autonomic dysfunction vs ?POTS   #Sinus tachycardia   #Transaminitis  #Myalgias  #Constipation    -trend cbc, fever curve, neg procal   -monitoring off abx for now   -guaifenesin for cough   -incentive spirometer given to patient  -CT chest with no consolidations or signs of PE   -f/u Bcx -ngtd  -isolation precautions   -monitor O2 sats, currently saturating well on room air, currently does not meet criteria for remdesivir or dexamethasone use   -tylenol prn for fever, pain   -tylenol prn and robaxin for muscle pains   -hot packs prn   -IVF hydration   -monitor HR, cardiology consulted, now on propranolol at 5mg q12hr, can send home on this dose  -trend LFTs, likely iso sepsis  -f/u US abdomen read  -bowel regimen  -CTH -no evidence of an acute transcortical infarction or hemorrhage, patient wants to get a second opinion from neuro, will be given outpatient office info   -dvt ppx  -discussed with patient at bedside, if US abd unremarkable and remains afebrile, can be d/c to home tomorrow morning  
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 Patient admitted for Sepsis and CVA r/o      # Sepsis.   # covid  # TIA   TIA 2/2 traumatic chiropractic manipulation  patient possibly having recurrance of symptoms ISO infection  f/u CTH.  
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 Patient admitted for Sepsis and CVA r/o      # Sepsis.   # covid  # TIA   TIA 2/2 traumatic chiropractic manipulation  patient possibly having recurrance of symptoms ISO infection  f/u CTH.  
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 Patient admitted for Sepsis and CVA r/o

## 2024-09-09 NOTE — PROGRESS NOTE ADULT - TIME BILLING
- Review of records, telemetry, vital signs and daily labs.   - General and cardiovascular physical examination.  - Generation of cardiovascular treatment plan and completion of note .  - Coordination of care.      Patient was seen and examined by me on 9/8/24 ,interim events noted,labs and radiology studies reviewed.  Ayaz Trotter MD,FACC.  [id number] [id number].  [phone]
- Review of records, telemetry, vital signs and daily labs.   - General and cardiovascular physical examination.  - Generation of cardiovascular treatment plan and completion of note .  - Coordination of care.      Patient was seen and examined by me on 9/9/24 ,interim events noted,labs and radiology studies reviewed.  Ayaz Trotter MD,FACC.  [address] [id number].  [phone]

## 2024-09-09 NOTE — PROGRESS NOTE ADULT - PROBLEM SELECTOR PLAN 4
2/2 traumatic chiropractic manipulation  on PRN propanolol 2.5 at home  Cardiology consult Dr. Trotter, 5mg of Propanolol bid

## 2024-09-09 NOTE — PROGRESS NOTE ADULT - PROBLEM SELECTOR PLAN 1
likely 2/2 COVID  CT chest negative   s/p zosyn in ED  hold ABx unless source identified  supportive measures  started methocarbamol for muscle aches  Monitor LFTs daily   c/w IVF

## 2024-09-09 NOTE — PROGRESS NOTE ADULT - SUBJECTIVE AND OBJECTIVE BOX
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 Patient admitted for Sepsis and CVA r/o    INTERVAL HPI/OVERNIGHT EVENTS:   Patient complains of blood at urethral meatus after urination, associated with burning sensation during urination. Patient has no other complains at this time, states that muscle aches have improved. Plan discussed with patient, states understanding.    REVIEW OF SYSTEMS:  CONSTITUTIONAL: Fever, weight loss, or fatigue  RESPIRATORY: No cough, wheezing, chills or hemoptysis; No shortness of breath  CARDIOVASCULAR: No chest pain, palpitations, dizziness, or leg swelling  GASTROINTESTINAL: No abdominal pain. No nausea, vomiting, or hematemesis; No diarrhea or constipation. No melena or hematochezia.  GENITOURINARY: Dysuria and hematuria, urinary frequency  NEUROLOGICAL: No headaches, memory loss, loss of strength, numbness, or tremors  SKIN: No itching, burning, rashes, or lesions     Vital Signs Last 24 Hrs  T(C): 37.3 (09 Sep 2024 11:22), Max: 37.9 (08 Sep 2024 23:15)  T(F): 99.2 (09 Sep 2024 11:22), Max: 100.2 (08 Sep 2024 23:15)  HR: 96 (09 Sep 2024 11:22) (89 - 100)  BP: 101/64 (09 Sep 2024 11:22) (101/64 - 109/72)  BP(mean): 78 (08 Sep 2024 18:37) (78 - 78)  RR: 18 (09 Sep 2024 11:22) (18 - 20)  SpO2: 98% (09 Sep 2024 11:22) (97% - 99%)    Parameters below as of 09 Sep 2024 11:22  Patient On (Oxygen Delivery Method): room air        PHYSICAL EXAMINATION:  GENERAL: NAD, well built  HEAD:  Atraumatic, Normocephalic  EYES:  conjunctiva and sclera clear  CHEST/LUNG: Clear to auscultation. No rales, rhonchi, wheezing, or rubs  HEART: Regular rate and rhythm; No murmurs, rubs, or gallops  ABDOMEN: Soft, Nontender, Nondistended; Bowel sounds present  : No blood seen at meatus of urethra, exam otherwise unremarkable  NERVOUS SYSTEM:  Alert & Oriented X3,  No focal deficits  EXTREMITIES:  2+ Peripheral Pulses, No clubbing, cyanosis, or edema  SKIN: warm dry                          13.1   4.92  )-----------( 278      ( 09 Sep 2024 06:45 )             39.6     09-09    138  |  106  |  10  ----------------------------<  106<H>  3.8   |  26  |  0.80    Ca    8.5      09 Sep 2024 06:45  Phos  3.7     09-09  Mg     2.1     09-09    TPro  6.9  /  Alb  3.4<L>  /  TBili  0.2  /  DBili  x   /  AST  64<H>  /  ALT  129<H>  /  AlkPhos  114  09-09    LIVER FUNCTIONS - ( 09 Sep 2024 06:45 )  Alb: 3.4 g/dL / Pro: 6.9 g/dL / ALK PHOS: 114 U/L / ALT: 129 U/L DA / AST: 64 U/L / GGT: x                   CAPILLARY BLOOD GLUCOSE      RADIOLOGY & ADDITIONAL TESTS:

## 2024-09-09 NOTE — PROGRESS NOTE ADULT - PROBLEM SELECTOR PLAN 3
h/o TIA 2/2 traumatic chiropractic manipulation  patient possibly having recurrance of symptoms ISO infection  CTH negative

## 2024-09-09 NOTE — PROGRESS NOTE ADULT - ATTENDING COMMENTS
Patient seen and examined at bedside this morning. Denies any shortness of breath or palpitations. No further fevers this morning, but he noted some small amount of blood when he wiped after urination. He notes that had happened in the past before and urologist had recommended cystoscopy. He also notes his father is sick today and may not be able to get him if he is discharged and asks if possible for the morning.   Labs, vitals and imaging reviewed as above. afebrile this morning, HR in 100s, tele noted with HR 90-100s sinus tachycardia. wbc wnl, LFTs elevated. PE – NC/AT, tachycardic, lungs grossly clear, abd soft, ntnd, no LE edema or calf ttp      A/P   43M, former smoker with pmh of POTS/autonomic dysfunction, hx of ?TIA after chiropractic manipulation (with baseline of unsteady gait, dizziness and myoclonic jerks), who was found to have covid at home, brought in for fevers and pain, admitted for sepsis 2/2 COVID infection.    #Sepsis 2/2 COVID infection   #Hx TIA iso chiro manipulation   #Autonomic dysfunction vs ?POTS   #Sinus tachycardia   #Transaminitis  #Myalgias  #Constipation    -trend cbc, fever curve, neg procal   -monitoring off abx for now   -guaifenesin for cough   -incentive spirometer given to patient  -CT chest with no consolidations or signs of PE   -f/u Bcx -ngtd  -isolation precautions   -monitor O2 sats, currently saturating well on room air, currently does not meet criteria for remdesivir or dexamethasone use   -tylenol prn for fever, pain   -tylenol prn and robaxin for muscle pains   -hot packs prn   -IVF hydration   -monitor HR, cardiology consulted, now on propranolol at 5mg q12hr, can send home on this dose  -trend LFTs, likely iso sepsis  -f/u US abdomen read  -bowel regimen  -CTH -no evidence of an acute transcortical infarction or hemorrhage, patient wants to get a second opinion from neuro, will be given outpatient office info   -dvt ppx  -discussed with patient at bedside, if US abd unremarkable and remains afebrile, can be d/c to home tomorrow morning

## 2024-09-09 NOTE — PROGRESS NOTE ADULT - SUBJECTIVE AND OBJECTIVE BOX
PATIENT SEEN AND EXAMINED BY JOSE GUILLEN M.D. ON :- 24  DATE OF SERVICE:  24           Interim events noted,Labs ,Radiological studies and Cardiology tests reviewed .    Patient is a 43y old  Male who presents with a chief complaint of Sepsis and CVA r/o (09 Sep 2024 10:34)      HPI:  43M PMHx TIA 2/2 to traumatic chiropractic manipulation in , ischemic brain injury, autonomic dysfunction, bedbound at baseline presented w/ c/o positive COVID test and persistent tachycardia. Patient stated he started feeling chills with weakness and muscle aches yesterday morning. He was having palpitations and tachycardia as well as chest pain radiating to his left shoulder, similar to when he had his TIA in . Patient took his PRN propanolol which usually resolves the palpitations and tachycardia but it did not work so he called his PCP who advised him to go to the ED for an EKG. Patient denies any dizziness ,headache, SOB, cough,  nausea, vomiting, diarrhea, abdominal pain,  lower extremity pain, or edema. Patient denies recent travel or sick contacts.  (07 Sep 2024 06:27)      PAST MEDICAL & SURGICAL HISTORY:      PREVIOUS DIAGNOSTIC TESTING:      ECHO  FINDINGS:    STRESS  FINDINGS:    CATHETERIZATION  FINDINGS:    MEDICATIONS  (STANDING):  benzocaine/menthol Lozenge 1 Lozenge Oral three times a day  enoxaparin Injectable 40 milliGRAM(s) SubCutaneous every 24 hours  guaiFENesin  milliGRAM(s) Oral every 12 hours  methocarbamol 500 milliGRAM(s) Oral two times a day  polyethylene glycol 3350 17 Gram(s) Oral daily  propranolol 5 milliGRAM(s) Oral every 12 hours  senna 2 Tablet(s) Oral at bedtime    MEDICATIONS  (PRN):  acetaminophen     Tablet .. 650 milliGRAM(s) Oral every 6 hours PRN Temp greater or equal to 38C (100.4F), Mild Pain (1 - 3)  aluminum hydroxide/magnesium hydroxide/simethicone Suspension 30 milliLiter(s) Oral every 4 hours PRN Dyspepsia  melatonin 3 milliGRAM(s) Oral at bedtime PRN Insomnia  ondansetron Injectable 4 milliGRAM(s) IV Push every 8 hours PRN Nausea and/or Vomiting  sodium chloride 0.65% Nasal 1 Spray(s) Both Nostrils four times a day PRN Nasal Congestion      FAMILY HISTORY:      SOCIAL HISTORY:    CIGARETTES:    ALCOHOL:    REVIEW OF SYSTEMS:  CONSTITUTIONAL: No fever, weight loss, or fatigue  EYES: No eye pain, visual disturbances, or discharge  ENMT:  No difficulty hearing, tinnitus, vertigo; No sinus or throat pain  NECK: No pain or stiffness  RESPIRATORY: No cough, wheezing, chills or hemoptysis; No shortness of breath  CARDIOVASCULAR: No chest pain, palpitations, dizziness, or leg swelling  GASTROINTESTINAL: No abdominal or epigastric pain. No nausea, vomiting, or hematemesis; No diarrhea or constipation. No melena or hematochezia.  GENITOURINARY: No dysuria, frequency, hematuria, or incontinence  NEUROLOGICAL: No headaches, memory loss, loss of strength, numbness, or tremors  SKIN: No itching, burning, rashes, or lesions   LYMPH NODES: No enlarged glands  ENDOCRINE: No heat or cold intolerance; No hair loss  MUSCULOSKELETAL: No joint pain or swelling; No muscle, back, or extremity pain  PSYCHIATRIC: No depression, anxiety, mood swings, or difficulty sleeping  HEME/LYMPH: No easy bruising, or bleeding gums  ALLERY AND IMMUNOLOGIC: No hives or eczema    Vital Signs Last 24 Hrs  T(C): 37.1 (09 Sep 2024 19:55), Max: 37.9 (08 Sep 2024 23:15)  T(F): 98.8 (09 Sep 2024 19:55), Max: 100.2 (08 Sep 2024 23:15)  HR: 95 (09 Sep 2024 19:55) (89 - 100)  BP: 119/82 (09 Sep 2024 19:55) (101/64 - 119/82)  BP(mean): --  RR: 18 (09 Sep 2024 19:55) (18 - 20)  SpO2: 98% (09 Sep 2024 19:55) (97% - 99%)    Parameters below as of 09 Sep 2024 19:55  Patient On (Oxygen Delivery Method): room air          PHYSICAL EXAM:  GENERAL: NAD, well-groomed, well-developed  HEAD:  Atraumatic, Normocephalic  EYES: EOMI, PERRLA, conjunctiva and sclera clear  ENMT: No tonsillar erythema, exudates, or enlargement; Moist mucous membranes, Good dentition, No lesions  NECK: Supple, No JVD, Normal thyroid  NERVOUS SYSTEM:  Alert & Oriented X3, Good concentration; Motor Strength 5/5 B/L upper and lower extremities; DTRs 2+ intact and symmetric  CHEST/LUNG: Clear to percussion bilaterally; No rales, rhonchi, wheezing, or rubs  HEART: Regular rate and rhythm; No murmurs, rubs, or gallops  ABDOMEN: Soft, Nontender, Nondistended; Bowel sounds present  EXTREMITIES:  2+ Peripheral Pulses, No clubbing, cyanosis, or edema  LYMPH: No lymphadenopathy noted  SKIN: No rashes or lesions      INTERPRETATION OF TELEMETRY:    ECG:    [facility]     LABS:                        13.1   4.92  )-----------( 278      ( 09 Sep 2024 06:45 )             39.6         138  |  106  |  10  ----------------------------<  106<H>  3.8   |  26  |  0.80    Ca    8.5      09 Sep 2024 06:45  Phos  3.7       Mg     2.1         TPro  6.9  /  Alb  3.4<L>  /  TBili  0.2  /  DBili  x   /  AST  64<H>  /  ALT  129<H>  /  AlkPhos  114            Urinalysis Basic - ( 09 Sep 2024 10:59 )    Color: Yellow / Appearance: Clear / S.014 / pH: x  Gluc: x / Ketone: Negative mg/dL  / Bili: Negative / Urobili: 0.2 mg/dL   Blood: x / Protein: Negative mg/dL / Nitrite: Negative   Leuk Esterase: Negative / RBC: 5 /HPF / WBC 1 /HPF   Sq Epi: x / Non Sq Epi: x / Bacteria: x      Lipid Panel:   I&O's Summary    09 Sep 2024 07:01  -  09 Sep 2024 22:24  --------------------------------------------------------  IN: 430 mL / OUT: 0 mL / NET: 430 mL        RADIOLOGY & ADDITIONAL STUDIES:

## 2024-09-09 NOTE — PROGRESS NOTE ADULT - PROBLEM SELECTOR PLAN 2
Patient had positive covid test at home  f/u RVP
Patient had positive covid test at home  RVP positive for COvid

## 2024-09-10 ENCOUNTER — TRANSCRIPTION ENCOUNTER (OUTPATIENT)
Age: 44
End: 2024-09-10

## 2024-09-10 VITALS
OXYGEN SATURATION: 96 % | SYSTOLIC BLOOD PRESSURE: 111 MMHG | HEART RATE: 94 BPM | TEMPERATURE: 99 F | DIASTOLIC BLOOD PRESSURE: 63 MMHG | RESPIRATION RATE: 19 BRPM

## 2024-09-10 LAB
ALBUMIN SERPL ELPH-MCNC: 3.4 G/DL — LOW (ref 3.5–5)
ALP SERPL-CCNC: 106 U/L — SIGNIFICANT CHANGE UP (ref 40–120)
ALT FLD-CCNC: 112 U/L DA — HIGH (ref 10–60)
ANION GAP SERPL CALC-SCNC: 7 MMOL/L — SIGNIFICANT CHANGE UP (ref 5–17)
AST SERPL-CCNC: 45 U/L — HIGH (ref 10–40)
BILIRUB SERPL-MCNC: 0.3 MG/DL — SIGNIFICANT CHANGE UP (ref 0.2–1.2)
BUN SERPL-MCNC: 12 MG/DL — SIGNIFICANT CHANGE UP (ref 7–18)
CALCIUM SERPL-MCNC: 8.7 MG/DL — SIGNIFICANT CHANGE UP (ref 8.4–10.5)
CHLORIDE SERPL-SCNC: 104 MMOL/L — SIGNIFICANT CHANGE UP (ref 96–108)
CO2 SERPL-SCNC: 26 MMOL/L — SIGNIFICANT CHANGE UP (ref 22–31)
CREAT SERPL-MCNC: 0.85 MG/DL — SIGNIFICANT CHANGE UP (ref 0.5–1.3)
EGFR: 111 ML/MIN/1.73M2 — SIGNIFICANT CHANGE UP
GLUCOSE SERPL-MCNC: 108 MG/DL — HIGH (ref 70–99)
HCT VFR BLD CALC: 40.1 % — SIGNIFICANT CHANGE UP (ref 39–50)
HGB BLD-MCNC: 13.5 G/DL — SIGNIFICANT CHANGE UP (ref 13–17)
INR BLD: 1.05 RATIO — SIGNIFICANT CHANGE UP (ref 0.85–1.18)
MAGNESIUM SERPL-MCNC: 2.4 MG/DL — SIGNIFICANT CHANGE UP (ref 1.6–2.6)
MCHC RBC-ENTMCNC: 27.1 PG — SIGNIFICANT CHANGE UP (ref 27–34)
MCHC RBC-ENTMCNC: 33.7 GM/DL — SIGNIFICANT CHANGE UP (ref 32–36)
MCV RBC AUTO: 80.4 FL — SIGNIFICANT CHANGE UP (ref 80–100)
MELD SCORE WITH DIALYSIS: 20 POINTS — SIGNIFICANT CHANGE UP
MELD SCORE WITHOUT DIALYSIS: 7 POINTS — SIGNIFICANT CHANGE UP
NRBC # BLD: 0 /100 WBCS — SIGNIFICANT CHANGE UP (ref 0–0)
PHOSPHATE SERPL-MCNC: 4.1 MG/DL — SIGNIFICANT CHANGE UP (ref 2.5–4.5)
PLATELET # BLD AUTO: 301 K/UL — SIGNIFICANT CHANGE UP (ref 150–400)
POTASSIUM SERPL-MCNC: 3.9 MMOL/L — SIGNIFICANT CHANGE UP (ref 3.5–5.3)
POTASSIUM SERPL-SCNC: 3.9 MMOL/L — SIGNIFICANT CHANGE UP (ref 3.5–5.3)
PROT SERPL-MCNC: 7.1 G/DL — SIGNIFICANT CHANGE UP (ref 6–8.3)
PROTHROM AB SERPL-ACNC: 11.9 SEC — SIGNIFICANT CHANGE UP (ref 9.5–13)
RBC # BLD: 4.99 M/UL — SIGNIFICANT CHANGE UP (ref 4.2–5.8)
RBC # FLD: 12.9 % — SIGNIFICANT CHANGE UP (ref 10.3–14.5)
SODIUM SERPL-SCNC: 137 MMOL/L — SIGNIFICANT CHANGE UP (ref 135–145)
WBC # BLD: 4.91 K/UL — SIGNIFICANT CHANGE UP (ref 3.8–10.5)
WBC # FLD AUTO: 4.91 K/UL — SIGNIFICANT CHANGE UP (ref 3.8–10.5)

## 2024-09-10 PROCEDURE — 84443 ASSAY THYROID STIM HORMONE: CPT

## 2024-09-10 PROCEDURE — 80053 COMPREHEN METABOLIC PANEL: CPT

## 2024-09-10 PROCEDURE — 76705 ECHO EXAM OF ABDOMEN: CPT

## 2024-09-10 PROCEDURE — 85610 PROTHROMBIN TIME: CPT

## 2024-09-10 PROCEDURE — 83735 ASSAY OF MAGNESIUM: CPT

## 2024-09-10 PROCEDURE — 36415 COLL VENOUS BLD VENIPUNCTURE: CPT

## 2024-09-10 PROCEDURE — 71046 X-RAY EXAM CHEST 2 VIEWS: CPT

## 2024-09-10 PROCEDURE — 87040 BLOOD CULTURE FOR BACTERIA: CPT

## 2024-09-10 PROCEDURE — 83605 ASSAY OF LACTIC ACID: CPT

## 2024-09-10 PROCEDURE — 96372 THER/PROPH/DIAG INJ SC/IM: CPT | Mod: XU

## 2024-09-10 PROCEDURE — 85025 COMPLETE CBC W/AUTO DIFF WBC: CPT

## 2024-09-10 PROCEDURE — 74176 CT ABD & PELVIS W/O CONTRAST: CPT | Mod: MC

## 2024-09-10 PROCEDURE — 71275 CT ANGIOGRAPHY CHEST: CPT | Mod: MC

## 2024-09-10 PROCEDURE — 84484 ASSAY OF TROPONIN QUANT: CPT

## 2024-09-10 PROCEDURE — 93005 ELECTROCARDIOGRAM TRACING: CPT

## 2024-09-10 PROCEDURE — 81003 URINALYSIS AUTO W/O SCOPE: CPT

## 2024-09-10 PROCEDURE — 0225U NFCT DS DNA&RNA 21 SARSCOV2: CPT

## 2024-09-10 PROCEDURE — 85730 THROMBOPLASTIN TIME PARTIAL: CPT

## 2024-09-10 PROCEDURE — 96374 THER/PROPH/DIAG INJ IV PUSH: CPT

## 2024-09-10 PROCEDURE — 84100 ASSAY OF PHOSPHORUS: CPT

## 2024-09-10 PROCEDURE — 99285 EMERGENCY DEPT VISIT HI MDM: CPT

## 2024-09-10 PROCEDURE — 70450 CT HEAD/BRAIN W/O DYE: CPT | Mod: MC

## 2024-09-10 PROCEDURE — 84145 PROCALCITONIN (PCT): CPT

## 2024-09-10 PROCEDURE — 99239 HOSP IP/OBS DSCHRG MGMT >30: CPT | Mod: GC

## 2024-09-10 PROCEDURE — 83880 ASSAY OF NATRIURETIC PEPTIDE: CPT

## 2024-09-10 PROCEDURE — 85027 COMPLETE CBC AUTOMATED: CPT

## 2024-09-10 PROCEDURE — 81001 URINALYSIS AUTO W/SCOPE: CPT

## 2024-09-10 RX ADMIN — GUAIFENESIN 600 MILLIGRAM(S): 100 LIQUID ORAL at 06:02

## 2024-09-10 RX ADMIN — Medication 1 LOZENGE: at 06:02

## 2024-09-10 RX ADMIN — METHOCARBAMOL 500 MILLIGRAM(S): 750 TABLET, FILM COATED ORAL at 06:02

## 2024-09-10 RX ADMIN — ACETAMINOPHEN 650 MILLIGRAM(S): 325 TABLET ORAL at 06:28

## 2024-09-10 NOTE — DISCHARGE NOTE NURSING/CASE MANAGEMENT/SOCIAL WORK - NSDCPEFALRISK_GEN_ALL_CORE
For information on Fall & Injury Prevention, visit: [email or web address] OR  [email or web address] OR  https://www.cdc.gov/steadi/patient.html

## 2024-09-10 NOTE — DISCHARGE NOTE NURSING/CASE MANAGEMENT/SOCIAL WORK - PATIENT PORTAL LINK FT
You can access the FollowMyHealth Patient Portal offered by [facility] by registering at the following website: [email or web address]. By joining [email or web address]’s FollowMyHealth portal, you will also be able to view your health information using other applications (apps) compatible with our system.

## 2024-09-12 LAB
CULTURE RESULTS: SIGNIFICANT CHANGE UP
CULTURE RESULTS: SIGNIFICANT CHANGE UP
SPECIMEN SOURCE: SIGNIFICANT CHANGE UP
SPECIMEN SOURCE: SIGNIFICANT CHANGE UP

## 2024-09-13 ENCOUNTER — TRANSCRIPTION ENCOUNTER (OUTPATIENT)
Age: 44
End: 2024-09-13

## 2024-09-13 RX ORDER — PROPRANOLOL HCL 60 MG
0.25 CAPSULE, EXTENDED RELEASE 24HR ORAL
Refills: 0 | DISCHARGE